# Patient Record
Sex: MALE | Race: WHITE | NOT HISPANIC OR LATINO | Employment: STUDENT | URBAN - METROPOLITAN AREA
[De-identification: names, ages, dates, MRNs, and addresses within clinical notes are randomized per-mention and may not be internally consistent; named-entity substitution may affect disease eponyms.]

---
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
## 2024-07-27 ENCOUNTER — HOSPITAL ENCOUNTER (INPATIENT)
Facility: HOSPITAL | Age: 19
LOS: 9 days | Discharge: HOME | DRG: 885 | End: 2024-08-05
Attending: HOSPITALIST | Admitting: PSYCHIATRY & NEUROLOGY
Payer: COMMERCIAL

## 2024-07-27 ENCOUNTER — HOSPITAL ENCOUNTER (EMERGENCY)
Facility: HOSPITAL | Age: 19
End: 2024-07-27
Attending: EMERGENCY MEDICINE | Admitting: EMERGENCY MEDICINE
Payer: COMMERCIAL

## 2024-07-27 ENCOUNTER — OFFICE VISIT (OUTPATIENT)
Dept: BEHAVIORAL/MENTAL HEALTH CLINIC | Facility: CLINIC | Age: 19
End: 2024-07-27

## 2024-07-27 VITALS
SYSTOLIC BLOOD PRESSURE: 113 MMHG | TEMPERATURE: 98.7 F | BODY MASS INDEX: 21.7 KG/M2 | OXYGEN SATURATION: 100 % | HEART RATE: 64 BPM | HEIGHT: 71 IN | RESPIRATION RATE: 18 BRPM | DIASTOLIC BLOOD PRESSURE: 73 MMHG | WEIGHT: 155 LBS

## 2024-07-27 DIAGNOSIS — E55.9 VITAMIN D DEFICIENCY: ICD-10-CM

## 2024-07-27 DIAGNOSIS — E53.8 VITAMIN B12 DEFICIENCY: ICD-10-CM

## 2024-07-27 DIAGNOSIS — Z00.8 MEDICAL CLEARANCE FOR PSYCHIATRIC ADMISSION: ICD-10-CM

## 2024-07-27 DIAGNOSIS — G47.00 INSOMNIA: ICD-10-CM

## 2024-07-27 DIAGNOSIS — F41.1 GENERALIZED ANXIETY DISORDER WITH PANIC ATTACKS: ICD-10-CM

## 2024-07-27 DIAGNOSIS — F33.1 MDD (MAJOR DEPRESSIVE DISORDER), RECURRENT EPISODE, MODERATE (HCC): Primary | ICD-10-CM

## 2024-07-27 DIAGNOSIS — F33.9 MAJOR DEPRESSIVE DISORDER, RECURRENT EPISODE WITH ANXIOUS DISTRESS (HCC): Primary | ICD-10-CM

## 2024-07-27 DIAGNOSIS — F41.0 GENERALIZED ANXIETY DISORDER WITH PANIC ATTACKS: ICD-10-CM

## 2024-07-27 DIAGNOSIS — R45.851 SUICIDAL IDEATION: Primary | ICD-10-CM

## 2024-07-27 LAB
AMPHETAMINES SERPL QL SCN: NEGATIVE
BARBITURATES UR QL: NEGATIVE
BENZODIAZ UR QL: NEGATIVE
COCAINE UR QL: NEGATIVE
ETHANOL EXG-MCNC: 0 MG/DL
FENTANYL UR QL SCN: NEGATIVE
HYDROCODONE UR QL SCN: NEGATIVE
METHADONE UR QL: NEGATIVE
OPIATES UR QL SCN: NEGATIVE
OXYCODONE+OXYMORPHONE UR QL SCN: NEGATIVE
PCP UR QL: NEGATIVE
THC UR QL: NEGATIVE

## 2024-07-27 PROCEDURE — 82075 ASSAY OF BREATH ETHANOL: CPT | Performed by: EMERGENCY MEDICINE

## 2024-07-27 PROCEDURE — 80307 DRUG TEST PRSMV CHEM ANLYZR: CPT | Performed by: EMERGENCY MEDICINE

## 2024-07-27 PROCEDURE — 99285 EMERGENCY DEPT VISIT HI MDM: CPT | Performed by: EMERGENCY MEDICINE

## 2024-07-27 PROCEDURE — 99285 EMERGENCY DEPT VISIT HI MDM: CPT

## 2024-07-27 RX ORDER — OLANZAPINE 10 MG/2ML
5 INJECTION, POWDER, FOR SOLUTION INTRAMUSCULAR
Status: DISCONTINUED | OUTPATIENT
Start: 2024-07-27 | End: 2024-08-05 | Stop reason: HOSPADM

## 2024-07-27 RX ORDER — DIPHENHYDRAMINE HYDROCHLORIDE 50 MG/ML
50 INJECTION INTRAMUSCULAR; INTRAVENOUS EVERY 6 HOURS PRN
Status: CANCELLED | OUTPATIENT
Start: 2024-07-27

## 2024-07-27 RX ORDER — OLANZAPINE 10 MG/2ML
2.5 INJECTION, POWDER, FOR SOLUTION INTRAMUSCULAR
Status: CANCELLED | OUTPATIENT
Start: 2024-07-27

## 2024-07-27 RX ORDER — HYDROXYZINE HYDROCHLORIDE 25 MG/1
25 TABLET, FILM COATED ORAL
Status: CANCELLED | OUTPATIENT
Start: 2024-07-27

## 2024-07-27 RX ORDER — OLANZAPINE 5 MG/1
5 TABLET ORAL
Status: CANCELLED | OUTPATIENT
Start: 2024-07-27

## 2024-07-27 RX ORDER — HYDROXYZINE HYDROCHLORIDE 25 MG/1
25 TABLET, FILM COATED ORAL
Status: DISCONTINUED | OUTPATIENT
Start: 2024-07-27 | End: 2024-08-05 | Stop reason: HOSPADM

## 2024-07-27 RX ORDER — OLANZAPINE 10 MG/2ML
5 INJECTION, POWDER, FOR SOLUTION INTRAMUSCULAR
Status: CANCELLED | OUTPATIENT
Start: 2024-07-27

## 2024-07-27 RX ORDER — HYDROXYZINE 50 MG/1
100 TABLET, FILM COATED ORAL
Status: CANCELLED | OUTPATIENT
Start: 2024-07-27

## 2024-07-27 RX ORDER — TRAZODONE HYDROCHLORIDE 50 MG/1
50 TABLET ORAL
Status: CANCELLED | OUTPATIENT
Start: 2024-07-27

## 2024-07-27 RX ORDER — AMOXICILLIN 250 MG
1 CAPSULE ORAL DAILY PRN
Status: DISCONTINUED | OUTPATIENT
Start: 2024-07-27 | End: 2024-08-05 | Stop reason: HOSPADM

## 2024-07-27 RX ORDER — BENZTROPINE MESYLATE 1 MG/ML
1 INJECTION INTRAMUSCULAR; INTRAVENOUS
Status: CANCELLED | OUTPATIENT
Start: 2024-07-27

## 2024-07-27 RX ORDER — OLANZAPINE 2.5 MG/1
2.5 TABLET, FILM COATED ORAL
Status: CANCELLED | OUTPATIENT
Start: 2024-07-27

## 2024-07-27 RX ORDER — ACETAMINOPHEN 325 MG/1
650 TABLET ORAL EVERY 4 HOURS PRN
Status: DISCONTINUED | OUTPATIENT
Start: 2024-07-27 | End: 2024-08-05 | Stop reason: HOSPADM

## 2024-07-27 RX ORDER — BENZTROPINE MESYLATE 1 MG/1
1 TABLET ORAL
Status: DISCONTINUED | OUTPATIENT
Start: 2024-07-27 | End: 2024-08-05 | Stop reason: HOSPADM

## 2024-07-27 RX ORDER — OLANZAPINE 2.5 MG/1
2.5 TABLET, FILM COATED ORAL
Status: DISCONTINUED | OUTPATIENT
Start: 2024-07-27 | End: 2024-08-05 | Stop reason: HOSPADM

## 2024-07-27 RX ORDER — HYDROXYZINE 50 MG/1
50 TABLET, FILM COATED ORAL
Status: DISCONTINUED | OUTPATIENT
Start: 2024-07-27 | End: 2024-08-05 | Stop reason: HOSPADM

## 2024-07-27 RX ORDER — BENZTROPINE MESYLATE 0.5 MG/1
1 TABLET ORAL
Status: CANCELLED | OUTPATIENT
Start: 2024-07-27

## 2024-07-27 RX ORDER — ACETAMINOPHEN 325 MG/1
975 TABLET ORAL EVERY 6 HOURS PRN
Status: DISCONTINUED | OUTPATIENT
Start: 2024-07-27 | End: 2024-08-05 | Stop reason: HOSPADM

## 2024-07-27 RX ORDER — HYDROXYZINE 50 MG/1
50 TABLET, FILM COATED ORAL
Status: CANCELLED | OUTPATIENT
Start: 2024-07-27

## 2024-07-27 RX ORDER — BENZTROPINE MESYLATE 1 MG/ML
1 INJECTION INTRAMUSCULAR; INTRAVENOUS
Status: DISCONTINUED | OUTPATIENT
Start: 2024-07-27 | End: 2024-08-05 | Stop reason: HOSPADM

## 2024-07-27 RX ORDER — DIPHENHYDRAMINE HYDROCHLORIDE 50 MG/ML
50 INJECTION INTRAMUSCULAR; INTRAVENOUS EVERY 6 HOURS PRN
Status: DISCONTINUED | OUTPATIENT
Start: 2024-07-27 | End: 2024-08-05 | Stop reason: HOSPADM

## 2024-07-27 RX ORDER — OLANZAPINE 5 MG/1
5 TABLET ORAL
Status: DISCONTINUED | OUTPATIENT
Start: 2024-07-27 | End: 2024-08-05 | Stop reason: HOSPADM

## 2024-07-27 RX ORDER — OLANZAPINE 10 MG/2ML
2.5 INJECTION, POWDER, FOR SOLUTION INTRAMUSCULAR
Status: DISCONTINUED | OUTPATIENT
Start: 2024-07-27 | End: 2024-08-05 | Stop reason: HOSPADM

## 2024-07-27 RX ORDER — TRAZODONE HYDROCHLORIDE 50 MG/1
50 TABLET ORAL
Status: DISCONTINUED | OUTPATIENT
Start: 2024-07-27 | End: 2024-08-02

## 2024-07-27 RX ORDER — HYDROXYZINE 50 MG/1
100 TABLET, FILM COATED ORAL
Status: DISCONTINUED | OUTPATIENT
Start: 2024-07-27 | End: 2024-08-05 | Stop reason: HOSPADM

## 2024-07-27 RX ORDER — PROPRANOLOL HYDROCHLORIDE 10 MG/1
10 TABLET ORAL EVERY 8 HOURS PRN
Status: DISCONTINUED | OUTPATIENT
Start: 2024-07-27 | End: 2024-08-05 | Stop reason: HOSPADM

## 2024-07-27 RX ORDER — PROPRANOLOL HYDROCHLORIDE 20 MG/1
10 TABLET ORAL EVERY 8 HOURS PRN
Status: CANCELLED | OUTPATIENT
Start: 2024-07-27

## 2024-07-27 RX ORDER — ACETAMINOPHEN 325 MG/1
650 TABLET ORAL EVERY 4 HOURS PRN
Status: CANCELLED | OUTPATIENT
Start: 2024-07-27

## 2024-07-27 RX ORDER — LORAZEPAM 2 MG/ML
2 INJECTION INTRAMUSCULAR EVERY 6 HOURS PRN
Status: CANCELLED | OUTPATIENT
Start: 2024-07-27

## 2024-07-27 RX ORDER — ACETAMINOPHEN 325 MG/1
650 TABLET ORAL EVERY 6 HOURS PRN
Status: CANCELLED | OUTPATIENT
Start: 2024-07-27

## 2024-07-27 RX ORDER — ACETAMINOPHEN 325 MG/1
650 TABLET ORAL EVERY 6 HOURS PRN
Status: DISCONTINUED | OUTPATIENT
Start: 2024-07-27 | End: 2024-08-05 | Stop reason: HOSPADM

## 2024-07-27 RX ORDER — AMOXICILLIN 250 MG
1 CAPSULE ORAL DAILY PRN
Status: CANCELLED | OUTPATIENT
Start: 2024-07-27

## 2024-07-27 RX ORDER — ACETAMINOPHEN 325 MG/1
975 TABLET ORAL EVERY 6 HOURS PRN
Status: CANCELLED | OUTPATIENT
Start: 2024-07-27

## 2024-07-27 RX ORDER — LORAZEPAM 2 MG/ML
2 INJECTION INTRAMUSCULAR EVERY 6 HOURS PRN
Status: DISCONTINUED | OUTPATIENT
Start: 2024-07-27 | End: 2024-08-05 | Stop reason: HOSPADM

## 2024-07-27 NOTE — ED PROVIDER NOTES
"History  Chief Complaint   Patient presents with    Suicidal     Pt reports that he has ha si for several months the patient. Pt reports that he has two plans to either jump off a parking garage or slashing his throat. Pt reports up and down thoughts. Has never been on medication      Patient is a 18-year-old male who presents from the [facility] [place] via EMS for evaluation of SI and major depression.  Patient signed a 201 at the facility.  He has been having thoughts of wanting to harm himself for the last 70 years but has been worsening over the last 3 to 4 months patient says that he has thoughts of either \"jumping off of a parking garage or slashing his throat.\"  The patient has never been on any psychiatric medications for this.  Patient denies any physical complaints at this time.  He denies any drug or alcohol use.  Denies any homicidal ideation, visual or auditory hallucinations        None       Past Medical History:   Diagnosis Date    No pertinent past medical history     RSV infection        Past Surgical History:   Procedure Laterality Date    HERNIA REPAIR      TYMPANOPLASTY      TYMPANOPLASTY      X 4        Family History   Problem Relation Age of Onset    No Known Problems Mother     No Known Problems Father     Thyroid disease Maternal Grandmother     Hyperlipidemia Maternal Grandmother     Heart attack Maternal Grandfather     Thyroid disease Maternal Grandfather      I have reviewed and agree with the history as documented.    E-Cigarette/Vaping    E-Cigarette Use Never User      E-Cigarette/Vaping Substances    Nicotine No     THC No     CBD No     Flavoring No     Other No     Unknown No      Social History     Tobacco Use    Smoking status: Never    Smokeless tobacco: Never   Vaping Use    Vaping status: Never Used   Substance Use Topics    Alcohol use: Not Currently    Drug use: Not Currently       Review of Systems   Constitutional:  Negative for chills, fever and unexpected weight change. "   HENT:  Negative for congestion, sore throat and trouble swallowing.    Eyes:  Negative for pain, discharge and itching.   Respiratory:  Negative for cough, chest tightness, shortness of breath and wheezing.    Cardiovascular:  Negative for chest pain, palpitations and leg swelling.   Gastrointestinal:  Negative for abdominal pain, blood in stool, diarrhea, nausea and vomiting.   Endocrine: Negative for polyuria.   Genitourinary:  Negative for difficulty urinating, dysuria, frequency and hematuria.   Musculoskeletal:  Negative for arthralgias and back pain.   Neurological:  Negative for dizziness, syncope, weakness, light-headedness and headaches.   Psychiatric/Behavioral:  Positive for suicidal ideas.        Physical Exam  Physical Exam  Vitals and nursing note reviewed.   Constitutional:       General: He is not in acute distress.     Appearance: He is well-developed.   HENT:      Head: Normocephalic and atraumatic.      Right Ear: External ear normal.      Left Ear: External ear normal.   Eyes:      Conjunctiva/sclera: Conjunctivae normal.      Pupils: Pupils are equal, round, and reactive to light.   Cardiovascular:      Rate and Rhythm: Normal rate and regular rhythm.      Heart sounds: Normal heart sounds. No murmur heard.     No friction rub. No gallop.   Pulmonary:      Effort: Pulmonary effort is normal. No respiratory distress.      Breath sounds: Normal breath sounds. No wheezing or rales.   Abdominal:      General: Bowel sounds are normal. There is no distension.      Palpations: Abdomen is soft.      Tenderness: There is no abdominal tenderness. There is no guarding.   Musculoskeletal:         General: No tenderness or deformity. Normal range of motion.      Cervical back: Normal range of motion.   Lymphadenopathy:      Cervical: No cervical adenopathy.   Skin:     General: Skin is warm and dry.   Neurological:      General: No focal deficit present.      Mental Status: He is alert and oriented to  person, place, and time. Mental status is at baseline.      Cranial Nerves: No cranial nerve deficit.      Sensory: No sensory deficit.      Motor: No weakness or abnormal muscle tone.   Psychiatric:         Attention and Perception: Attention normal.         Mood and Affect: Mood normal.         Speech: Speech normal.         Behavior: Behavior normal.         Thought Content: Thought content is not paranoid or delusional. Thought content includes suicidal ideation. Thought content does not include homicidal ideation. Thought content includes suicidal plan. Thought content does not include homicidal plan.         Cognition and Memory: Cognition normal.         Judgment: Judgment normal.         Vital Signs  ED Triage Vitals   Temperature Pulse Respirations Blood Pressure SpO2   07/27/24 1055 07/27/24 1055 07/27/24 1055 07/27/24 1058 07/27/24 1055   98.7 °F (37.1 °C) 61 18 113/73 100 %      Temp Source Heart Rate Source Patient Position - Orthostatic VS BP Location FiO2 (%)   07/27/24 1055 07/27/24 1055 07/27/24 1055 -- --   Temporal Monitor Lying        Pain Score       07/27/24 1055       No Pain           Vitals:    07/27/24 1055 07/27/24 1058 07/27/24 1100   BP:  113/73 113/73   Pulse: 61  64   Patient Position - Orthostatic VS: Lying           Visual Acuity      ED Medications  Medications - No data to display    Diagnostic Studies  Results Reviewed       Procedure Component Value Units Date/Time    Rapid drug screen, urine [[id number]]  (Normal) Collected: 07/27/24 1108    Lab Status: Final result Specimen: Urine, Clean Catch Updated: 07/27/24 1125     Amph/Meth UR Negative     Barbiturate Ur Negative     Benzodiazepine Urine Negative     Cocaine Urine Negative     Methadone Urine Negative     Opiate Urine Negative     PCP Ur Negative     THC Urine Negative     Oxycodone Urine Negative     Fentanyl Urine Negative     HYDROCODONE URINE Negative    Narrative:      FOR MEDICAL PURPOSES ONLY.   IF CONFIRMATION  "NEEDED PLEASE CONTACT THE LAB WITHIN 5 DAYS.    Drug Screen Cutoff Levels:  AMPHETAMINE/METHAMPHETAMINES  1000 ng/mL  BARBITURATES     200 ng/mL  BENZODIAZEPINES     200 ng/mL  COCAINE      300 ng/mL  METHADONE      300 ng/mL  OPIATES      300 ng/mL  PHENCYCLIDINE     25 ng/mL  THC       50 ng/mL  OXYCODONE      100 ng/mL  FENTANYL      5 ng/mL  HYDROCODONE     300 ng/mL    POCT alcohol breath test [[id number]]  (Normal) Resulted: 07/27/24 1113    Lab Status: Final result Updated: 07/27/24 1113     EXTBreath Alcohol 0.000                   No orders to display              Procedures  Procedures         ED Course  ED Course as of 07/27/24 1546   Sat Jul 27, 2024   1431 Patient accepted at the [facility].  Pickup at 4 PM         CRAFFT      Flowsheet Row Most Recent Value   CRAFFT Initial Screen: During the past 12 months, did you:    1. Drink any alcohol (more than a few sips)?  No Filed at: 07/27/2024 1059   2. Smoke any marijuana or hashish No Filed at: 07/27/2024 1059   3. Use anything else to get high? (\"anything else\" includes illegal drugs, over the counter and prescription drugs, and things that you sniff or 'parrish')? No Filed at: 07/27/2024 1059                                              Medical Decision Making  18-year-old male presenting for suicidal ideation.  Has been ongoing over the last 70 years but worsening over last 3 to 4 months.  Admits to thoughts of wanting to jump off a parking garage or slash his throat.  Has never been seen for this in the past has not been on many medications.  Signed 201 at outpatient center.  Will obtain point-of-care alcohol, UDS.  Will have crisis evaluate for bed placement  Patient signed 201.  Excepted at [place] and [facility].    Problems Addressed:  Suicidal ideation: acute illness or injury    Amount and/or Complexity of Data Reviewed  Labs: ordered.    Risk  Decision regarding hospitalization.                 Disposition  Final diagnoses:   Suicidal ideation     Time " reflects when diagnosis was documented in both MDM as applicable and the Disposition within this note       Time User Action Codes Description Comment    7/27/2024 11:19 AM Mannie Virgen Add [R45.851] Suicidal ideation     7/27/2024  1:45 PM Lester Ochoa Add [Z00.8] Medical clearance for psychiatric admission           ED Disposition       ED Disposition   Transfer to Behavioral Health Condition   --    Date/Time   Sat Jul 27, 2024 1119    Comment   Lester Maher should be transferred out to [facility] and has been medically cleared.               MD Documentation      Flowsheet Row Most Recent Value   Patient Condition The patient has been stabilized such that within reasonable medical probability, no material deterioration of the patient condition or the condition of the unborn child(brian) is likely to result from the transfer   Reason for Transfer Level of Care needed not available at this facility   Benefits of Transfer Specialized equipment and/or services available at the receiving facility (Include comment)________________________  [psychiatric treatment]   Risks of Transfer Potential for delay in receiving treatment   Accepting Physician Dr. Ochoa   Accepting Facility Name, [place], [place]    (Name & Tel number) [facility] - [phone]   Transported by (Company and Unit #) [facility]   Sending MD Dr. Virgen   Provider Certification General risk, such as traffic hazards, adverse weather conditions, rough terrain or turbulence, possible failure of equipment (including vehicle or aircraft), or consequences of actions of persons outside the control of the transport personnel, The patient is stable for psychiatric transfer because they are medically stable, and is protected from harming him/herself or others during transport          RN Documentation      Flowsheet Row Most Recent Value   Accepting Facility Name, [place], [place]   Transfer  Coordinator (Name & Tel number) [facility] - [phone]   Transport Mode Ambulance   Transported by (Company and Unit #) [facility]   Level of Care Other (Comment)  [secure psych transport]   Patient Belongings Disposition Sent with patient   Transfer Date 07/27/24   Transfer Time 1600          Follow-up Information    None         Patient's Medications    No medications on file       No discharge procedures on file.    PDMP Review       None            ED Provider  Electronically Signed by             Mannie Virgen DO  07/27/24 8703

## 2024-07-27 NOTE — NURSING NOTE
"18 year old male admitted from [place] ED with SI and high anxiety Patient does not identify a specific cause of anxiety at this time. Noted history of self harm by cutting, patient has no scares at this time. Calm cooperative, lives at home with dad no psych history, not taking any mediations UDS neg. Reports \"normal\" relationship with father \"ok\" relationship with mom who lives in [place]. Refused shower skin check completed and wnl paper work signed oriented to unit, Safety checks initiated.   "

## 2024-07-27 NOTE — ED NOTES
Patient is accepted at [facility].  Patient is accepted by Dr. Gabriela Hartman.     Transportation is arranged with [facility].     Transportation is scheduled for 16:00.   Patient may go to the floor at any time.          Nurse report is to be called to [phone] prior to patient transfer.

## 2024-07-27 NOTE — PROGRESS NOTES
"Assessment      Crisis Intake  Patient Intake  Special Needs: N/A  Living Arrangement: Lives with someone (lives with parents)  Can patient return home?: Yes (following hospital evaluation and potential inpatient stay)  Address to be Discharge to:: see chart  Patient's Telephone Number: [phone]  Access to Firearms: No  Type of Work: [employer]  Work History: Part-time  School Name: [employer] (one year)  School Grade/Year: College freshman  Unemployed / MA applicant:: N/A  Patient History  Presenting Problems: Patient presents to the walk-in center with suicidal ideations and visual hallucinations. Patient reports that he recently cut himself in the chest area in response to ongoing anxiety. Patient reports that he \"doesn't see people or buildings, only open husks.\" Patient also reports seeing \"ghosts.\" Patient reports experiencing \"bizarre thoughts like wondering what another person's blood might taste like.\" Patient stated that he \"fears he may murder someone\" but then denies having thoughts to harm others. Patient states that he has a history of self-harm by cutting and states \"it was fun.\" Patient reports that his anxiety is \"raised to the max\" and he is experiencing \"random irritability.\" Patient lives at home with his parents and works part time at [employer]. Patient denies changes in eating or sleeping habits. Patient denies history of mental health treatment or substance abuse as well as any legal involvement. Patient reports that he talked with school counselors in high school and would have \"casual conversations with psychology professors\" in college but \"nothing official.\" This writer reviewed the 201 process and patient rights to which patient acknowledged understanding. Patient signed a 201 and is being transported to [facility]. Crisis worker and charge nurse notified.  Treatment History: School counseling in high " "school  Currently in Treatment: No  Name of ICM:: N/A  ICM Phone Number:: N/A  Community Agency Supports: N/A  Medical Problems: seasonal allergies  Legal Issues: N/A  Probation/ Name (if applicable): denies  Substance Abuse: No  Mental Status Exam  Orientation Level: Oriented X4  Affect: Appropriate  Speech: Soft  Mood: Irritable, Anxious  Thought Content: Suicidal ideation, Homicidal ideation  Hallucination Type: Auditory, Visual  Describe Hallucinations: Patient reports an inability to see people or buildings, stating he only sees \"husks.\" Patient also describes seeing ghosts.  Judgement: Poor  Impulse Control: Poor  Attention Span: Appropriate for age  Memory: Impaired  Appetite: Good  Weight Changes: denies  Sleep: Good  Total Hours of Sleep: 8 hours nightly  Specific Sleep Problem: denies  Appear/Hygiene: Bizarre  ADL Comments: Independent  Strengths and Limitations  Patient Strengths: Good support system, Financially secure, Insightful, Self reliant, Negotiates basic needs, Family ties  Patient Limitations: Difficulty adapting, Poor reasoning ability  Ideations  Current Self Harm/Suicidal Ideation: Yes  Description of current self harm/suicidal ideation:: patient reports self harm via cutting chest in recent weeks  Previous Self Harm/Suicidal Ideation: Yes  Description of self harming behaviors or thoughts:: patient reports self harm(cutting) on chest in recent weeks  Violence Risk to Self: Yes- Within the past 6 months  Current homicidal or violent thoughts toward another: Yes- Within the past 6 months (Patient states that he worries he will murder someone but then denies thoughts to harm others.)  Description of current thoughts/plans:: N/A  Previous Plans to Harm Another Person: No  Description of violent thoughts or behaviors toward others:: Patient states that he worries he will murder someone but then denies thoughts to harm others.  Violence Risk to Others: Yes- Within the last 6 months " (Patient states that he worries he will murder someone but then denies thoughts to harm others.)  Previous History of Violence to Others: No  Provisional Diagnosis  Axis I: Major Depressive Disorder    C-SSRS  [place] Suicide Severity Rating Scale  C-SSRS Q1. Wish to be Dead: No - In the Past Month  C-SSRS Q2. Suicidal Thoughts: Yes - In the Past Month  C-SSRS Q3. Suicidal Thoughts with Method (without Specific Plan or Intent to Act): Yes - In the Past Month  C-SSRS Q4. Suicidal Intent (without Specific Plan): No - In the Past Month  C-SSRS Q5. Suicide Intent with Specific Plan: Yes - In the Past Month  C-SSRS Q6. Suicide Behavior Question: Yes  C-SSRS Q6a. Was this within the past 3 months? : Yes  *Risk Level*: High Risk      Patient was referred by: Self or Family    Visit start and stop times:    07/27/24  Start Time: (P) 0900  Stop Time: (P) 1000  Total Visit Time: (P) 60 minutes      Patient transported to [facility] via ambulance.

## 2024-07-27 NOTE — LETTER
[facility]  [address] [facility] [address]  [phone]  Dept: [phone]    August 5, 2024    Patient: Lester Maher   YOB: 2005   Date of Visit: 7/27/2024       To Whom it May Concern:    Lester Maher was under my professional care. He was seen in the hospital from 7/27/2024 to 08/05/2021. He may return to work on 8/6/2024 without limitations.    If you have any questions or concerns, please don't hesitate to call.         Sincerely,          Aziza Garcia

## 2024-07-27 NOTE — LETTER
[facility] EMERGENCY DEPARTMENT  [address] [place] [address]  Dept: [phone]      EMTALA TRANSFER CONSENT    NAME Lester Maher                                         2005                              MRN [id number]    I have been informed of my rights regarding examination, treatment, and transfer   by Dr. Mannie Virgen DO    Benefits: Specialized equipment and/or services available at the receiving facility (Include comment)________________________ (psychiatric treatment)    Risks: Potential for delay in receiving treatment    Transfer Request   I acknowledge that my medical condition has been evaluated and explained to me by the emergency department physician or other qualified medical person and/or my attending physician who has recommended and offered to me further medical examination and treatment. I understand the Hospital's obligation with respect to the treatment and stabilization of my emergency medical condition. I nevertheless request to be transferred. I release the Hospital, the doctor, and any other persons caring for me from all responsibility or liability for any injury or ill effects that may result from my transfer and agree to accept all responsibility for the consequences of my choice to transfer, rather than receive stabilizing treatment at the Hospital. I understand that because the transfer is my request, my insurance may not provide reimbursement for the services.  The Hospital will assist and direct me and my family in how to make arrangements for transfer, but the hospital is not liable for any fees charged by the transport service.  In spite of this understanding, I refuse to consent to further medical examination and treatment which has been offered to me, and request transfer to Accepting Facility Name, City & State : [place]. I authorize the performance of emergency medical procedures and treatments upon me in both transit  and upon arrival at the receiving facility.  Additionally, I authorize the release of any and all medical records to the receiving facility and request they be transported with me, if possible.    I authorize the performance of emergency medical procedures and treatments upon me in both transit and upon arrival at the receiving facility.  Additionally, I authorize the release of any and all medical records to the receiving facility and request they be transported with me, if possible.  I understand that the safest mode of transportation during a medical emergency is an ambulance and that the Hospital advocates the use of this mode of transport. Risks of traveling to the receiving facility by car, including absence of medical control, life sustaining equipment, such as oxygen, and medical personnel has been explained to me and I fully understand them.    (SUSU CORRECT BOX BELOW)  [ X ]  I consent to the stated transfer and to be transported by ambulance/helicopter.  [  ]  I consent to the stated transfer, but refuse transportation by ambulance and accept full responsibility for my transportation by car.  I understand the risks of non-ambulance transfers and I exonerate the Hospital and its staff from any deterioration in my condition that results from this refusal.    X___________________________________________    DATE  24  TIME________  Signature of patient or legally responsible individual signing on patient behalf           RELATIONSHIP TO PATIENT____self_____________________          Provider Certification    NAME Lester Maher                                         2005                              MRN [id number]    A medical screening exam was performed on the above named patient.  Based on the examination:    Condition Necessitating Transfer The primary encounter diagnosis was Suicidal ideation. A diagnosis of Medical clearance for psychiatric admission was also pertinent to this  visit.    Patient Condition: The patient has been stabilized such that within reasonable medical probability, no material deterioration of the patient condition or the condition of the unborn child(brian) is likely to result from the transfer    Reason for Transfer: Level of Care needed not available at this facility    Transfer Requirements: [place], [place]   Space available and qualified personnel available for treatment as acknowledged by [facility] - [phone]  Agreed to accept transfer and to provide appropriate medical treatment as acknowledged by       Dr. Ochoa  Appropriate medical records of the examination and treatment of the patient are provided at the time of transfer   STAFF INITIAL WHEN COMPLETED _______  Transfer will be performed by qualified personnel from [facility]  and appropriate transfer equipment as required, including the use of necessary and appropriate life support measures.    Provider Certification: I have examined the patient and explained the following risks and benefits of being transferred/refusing transfer to the patient/family:  General risk, such as traffic hazards, adverse weather conditions, rough terrain or turbulence, possible failure of equipment (including vehicle or aircraft), or consequences of actions of persons outside the control of the transport personnel, The patient is stable for psychiatric transfer because they are medically stable, and is protected from harming him/herself or others during transport      Based on these reasonable risks and benefits to the patient and/or the unborn child(brian), and based upon the information available at the time of the patient’s examination, I certify that the medical benefits reasonably to be expected from the provision of appropriate medical treatments at another medical facility outweigh the increasing risks, if any, to the individual’s medical condition, and in the case of labor to the unborn child, from  effecting the transfer.    X____________________________________________ DATE 07/27/24        TIME__14:30_____      ORIGINAL - SEND TO MEDICAL RECORDS   COPY - SEND WITH PATIENT DURING TRANSFER

## 2024-07-27 NOTE — ED NOTES
Insurance Authorization for admission:   Phone call placed to [facility].  Phone number: [phone].     Spoke to Anna Marie.     4 days approved.  Level of care: 201.  Review on 7/30.   Authorization # [id number].

## 2024-07-27 NOTE — PROGRESS NOTES
[facility] staff returned to walk-in [place] with patient's car keys. They had been told that patient's friend, who accompanied him to [facility], would be waiting here to retrieve personal items from the vehicle. Patient's keys will be provided to security with a note that Raquel Mullenmons has been given permission by patient to retrieve items from vehicle.

## 2024-07-28 PROBLEM — F41.0 GENERALIZED ANXIETY DISORDER WITH PANIC ATTACKS: Status: ACTIVE | Noted: 2024-07-28

## 2024-07-28 PROBLEM — F41.1 GENERALIZED ANXIETY DISORDER WITH PANIC ATTACKS: Status: ACTIVE | Noted: 2024-07-28

## 2024-07-28 PROBLEM — F33.1 MDD (MAJOR DEPRESSIVE DISORDER), RECURRENT EPISODE, MODERATE (HCC): Status: ACTIVE | Noted: 2024-07-28

## 2024-07-28 LAB
25(OH)D3 SERPL-MCNC: 12.4 NG/ML (ref 30–100)
ALBUMIN SERPL BCG-MCNC: 4.6 G/DL (ref 3.5–5)
ALP SERPL-CCNC: 54 U/L (ref 34–104)
ALT SERPL W P-5'-P-CCNC: 6 U/L (ref 7–52)
ANION GAP SERPL CALCULATED.3IONS-SCNC: 8 MMOL/L (ref 4–13)
AST SERPL W P-5'-P-CCNC: 12 U/L (ref 13–39)
BASOPHILS # BLD AUTO: 0.02 THOUSANDS/ÂΜL (ref 0–0.1)
BASOPHILS NFR BLD AUTO: 0 % (ref 0–1)
BILIRUB SERPL-MCNC: 3.23 MG/DL (ref 0.2–1)
BUN SERPL-MCNC: 9 MG/DL (ref 5–25)
CALCIUM SERPL-MCNC: 9.3 MG/DL (ref 8.4–10.2)
CHLORIDE SERPL-SCNC: 106 MMOL/L (ref 96–108)
CHOLEST SERPL-MCNC: 124 MG/DL
CO2 SERPL-SCNC: 26 MMOL/L (ref 21–32)
CREAT SERPL-MCNC: 0.85 MG/DL (ref 0.6–1.3)
EOSINOPHIL # BLD AUTO: 0.11 THOUSAND/ÂΜL (ref 0–0.61)
EOSINOPHIL NFR BLD AUTO: 2 % (ref 0–6)
ERYTHROCYTE [DISTWIDTH] IN BLOOD BY AUTOMATED COUNT: 12.1 % (ref 11.6–15.1)
FOLATE SERPL-MCNC: 9.2 NG/ML
GFR SERPL CREATININE-BSD FRML MDRD: 125 ML/MIN/1.73SQ M
GLUCOSE P FAST SERPL-MCNC: 77 MG/DL (ref 65–99)
GLUCOSE SERPL-MCNC: 77 MG/DL (ref 65–140)
HCT VFR BLD AUTO: 39.8 % (ref 36.5–49.3)
HDLC SERPL-MCNC: 44 MG/DL
HGB BLD-MCNC: 13.4 G/DL (ref 12–17)
IMM GRANULOCYTES # BLD AUTO: 0.01 THOUSAND/UL (ref 0–0.2)
IMM GRANULOCYTES NFR BLD AUTO: 0 % (ref 0–2)
LDLC SERPL CALC-MCNC: 70 MG/DL (ref 0–100)
LYMPHOCYTES # BLD AUTO: 2.05 THOUSANDS/ÂΜL (ref 0.6–4.47)
LYMPHOCYTES NFR BLD AUTO: 32 % (ref 14–44)
MCH RBC QN AUTO: 30.3 PG (ref 26.8–34.3)
MCHC RBC AUTO-ENTMCNC: 33.7 G/DL (ref 31.4–37.4)
MCV RBC AUTO: 90 FL (ref 82–98)
MONOCYTES # BLD AUTO: 0.48 THOUSAND/ÂΜL (ref 0.17–1.22)
MONOCYTES NFR BLD AUTO: 8 % (ref 4–12)
NEUTROPHILS # BLD AUTO: 3.67 THOUSANDS/ÂΜL (ref 1.85–7.62)
NEUTS SEG NFR BLD AUTO: 58 % (ref 43–75)
NRBC BLD AUTO-RTO: 0 /100 WBCS
PLATELET # BLD AUTO: 222 THOUSANDS/UL (ref 149–390)
PMV BLD AUTO: 9.8 FL (ref 8.9–12.7)
POTASSIUM SERPL-SCNC: 3.8 MMOL/L (ref 3.5–5.3)
PROT SERPL-MCNC: 6.4 G/DL (ref 6.4–8.4)
RBC # BLD AUTO: 4.42 MILLION/UL (ref 3.88–5.62)
SODIUM SERPL-SCNC: 140 MMOL/L (ref 135–147)
TRIGL SERPL-MCNC: 49 MG/DL
TSH SERPL DL<=0.05 MIU/L-ACNC: 0.62 UIU/ML (ref 0.45–4.5)
VIT B12 SERPL-MCNC: 215 PG/ML (ref 180–914)
WBC # BLD AUTO: 6.34 THOUSAND/UL (ref 4.31–10.16)

## 2024-07-28 PROCEDURE — 99223 1ST HOSP IP/OBS HIGH 75: CPT

## 2024-07-28 PROCEDURE — 80053 COMPREHEN METABOLIC PANEL: CPT

## 2024-07-28 PROCEDURE — 82607 VITAMIN B-12: CPT

## 2024-07-28 PROCEDURE — 80061 LIPID PANEL: CPT

## 2024-07-28 PROCEDURE — 84443 ASSAY THYROID STIM HORMONE: CPT

## 2024-07-28 PROCEDURE — 82306 VITAMIN D 25 HYDROXY: CPT

## 2024-07-28 PROCEDURE — 82746 ASSAY OF FOLIC ACID SERUM: CPT

## 2024-07-28 PROCEDURE — 85025 COMPLETE CBC W/AUTO DIFF WBC: CPT

## 2024-07-28 RX ORDER — ESCITALOPRAM OXALATE 10 MG/1
10 TABLET ORAL DAILY
Status: DISCONTINUED | OUTPATIENT
Start: 2024-07-28 | End: 2024-08-05 | Stop reason: HOSPADM

## 2024-07-28 RX ORDER — ARIPIPRAZOLE 2 MG/1
2 TABLET ORAL DAILY
Status: DISCONTINUED | OUTPATIENT
Start: 2024-07-28 | End: 2024-07-30

## 2024-07-28 RX ADMIN — ESCITALOPRAM OXALATE 10 MG: 10 TABLET ORAL at 10:06

## 2024-07-28 RX ADMIN — ARIPIPRAZOLE 2 MG: 2 TABLET ORAL at 10:06

## 2024-07-28 NOTE — NURSING NOTE
Patient med and meal compliant visible on unit calm cooperative social with select peers, states he's adjusting to the unit, denies SI HI AVH at this time Safety checks maintained.

## 2024-07-28 NOTE — TREATMENT PLAN
TREATMENT PLAN REVIEW - Behavioral Health Lester Maher 18 y.o. 2005 male MRN: [id number]    [facility] BEHAVIORAL HEALTH Room / Bed: [facility] 218/[facility] 218-02 Encounter: [id number]          Admit Date/Time:  7/27/2024  4:36 PM    Treatment Team:   MD Tamar Villatoro LPN Andrea M Miksic Christina Ayers Kaleyann Doyle William Widarsono    Diagnosis: Principal Problem:    MDD (major depressive disorder), recurrent episode, moderate (HCC)  Active Problems:    Generalized anxiety disorder with panic attacks      Patient Strengths/Assets: ability for insight, average or above intelligence, capable of independent living, communication skills, general fund of knowledge, good physical health, insightful, motivation for treatment/growth, patient is on a voluntary commitment, patient is willing to work on problems, reasoning ability, supportive family/friends, work skills    Patient Barriers/Limitations: difficulty adapting, marital/family conflict    Short Term Goals: decrease in depressive symptoms, decrease in anxiety symptoms, decrease in suicidal thoughts, decrease in homicidal thoughts, improvement in ability to express basic needs, improvement in insight, improvement in reality testing, improvement in reasoning ability    Long Term Goals: improvement in depression, improvement in anxiety, free of suicidal thoughts, free of homicidal thoughts, acceptance of need for psychiatric medications, acceptance of need for psychiatric treatment, acceptance of need for psychiatric follow up after discharge, adequate sleep, adequate appetite, adequate oral intake, stable living arrangements upon discharge, establishment of family support upon discharge    Progress Towards Goals: starting psychiatric medications as prescribed    Recommended Treatment: medication management, patient medication education, group therapy, milieu therapy, continued Behavioral Health  psychiatric evaluation/assessment process    Treatment Frequency: daily medication monitoring, group and milieu therapy daily, monitoring through interdisciplinary rounds, monitoring through weekly patient care conferences    Expected Discharge Date:  8/2/24    Discharge Plan: referral for outpatient medication management with a psychiatrist, return to previous living arrangement    Treatment Plan Created/Updated By: Lester Ochoa MD

## 2024-07-28 NOTE — H&P
GunnarChri#  :2005 M  MRN:[id number]    CSN:[id number]  Adm Date: 2024 1636  4:36 PM   ATT PHY: Trixie Murcia Md  [facility]         Chief Complaint: depression, suicidal ideations      History of Presenting Illness: Lester Maher is a(n) 18 y.o. year old male who is admitted to [facility] on voluntary 201 commitment basis.  Patient originally presented to [facility] ED on 2024 for worsening depression with suicidal ideations.     Patient examined at bedside.  Patient currently denies any physical symptoms.  He reports no medical problems and does not take anything on a daily basis at home.    Admission labs:  Lipid panel, TSH, CBC normal.  CMP with total bilirubin 3.23.  Vitamin D, b12, folate pending.     No Known Allergies    No current facility-administered medications on file prior to encounter.     No current outpatient medications on file prior to encounter.       Active Ambulatory Problems     Diagnosis Date Noted    Exercise counseling 2020    Need for vaccination 2020    Hamstring tightness of both lower extremities 2020    Body mass index, pediatric, 5th percentile to less than 85th percentile for age 2020     Resolved Ambulatory Problems     Diagnosis Date Noted    Flat foot 2020     Past Medical History:   Diagnosis Date    No pertinent past medical history     RSV infection      Past Surgical History:   Procedure Laterality Date    HERNIA REPAIR      TYMPANOPLASTY      TYMPANOPLASTY      X 4      Social History:   Social History     Socioeconomic History    Marital status: Single     Spouse name: Not on file    Number of children: Not on file    Years of education: Not on file    Highest education level: Not on file   Occupational History    Not on file   Tobacco Use    Smoking status: Never    Smokeless tobacco: Never   Vaping Use    Vaping status: Never  "Used   Substance and Sexual Activity    Alcohol use: Not Currently    Drug use: Not Currently    Sexual activity: Not Currently   Other Topics Concern    Not on file   Social History Narrative    Two cats      Social Determinants of Health     Financial Resource Strain: Not on file   Food Insecurity: Not on file   Transportation Needs: Not on file   Physical Activity: Not on file   Stress: Not on file   Social Connections: Unknown (6/18/2024)    Received from [employer]     How often do you feel lonely or isolated from those around you? (Adult - for ages 18 years and over): Not on file   Intimate Partner Violence: Not on file   Housing Stability: Not on file     Family History:   Family History   Problem Relation Age of Onset    No Known Problems Mother     No Known Problems Father     Thyroid disease Maternal Grandmother     Hyperlipidemia Maternal Grandmother     Heart attack Maternal Grandfather     Thyroid disease Maternal Grandfather      Review of Systems   Constitutional:  Negative for chills and fever.   HENT:  Negative for ear pain and sore throat.    Eyes:  Negative for pain and visual disturbance.   Respiratory:  Negative for cough and shortness of breath.    Cardiovascular:  Negative for chest pain and palpitations.   Gastrointestinal:  Negative for abdominal pain, constipation, diarrhea, nausea and vomiting.   Genitourinary:  Negative for dysuria and hematuria.   Musculoskeletal:  Negative for arthralgias and back pain.   Skin:  Negative for color change and rash.   Neurological:  Negative for dizziness and headaches.   Psychiatric/Behavioral:  Positive for dysphoric mood and suicidal ideas.    All other systems reviewed and are negative.    Physical Exam   Vitals: Blood pressure 95/54, pulse 87, temperature 97.9 °F (36.6 °C), temperature source Temporal, resp. rate 17, height 5' 10.5\" (1.791 m), weight 64.8 kg (142 lb 12.8 oz), SpO2 98%.,Body mass index is 20.2 " kg/m².  Constitutional: Awake, alert, in no acute distress.  Head: Normocephalic and atraumatic.   Mouth/Throat: Oropharynx is clear and moist.    Eyes: Conjunctivae and EOM are normal.   Neck: Neck supple.  Cardiovascular: Normal rate, regular rhythm and normal heart sounds.    Pulmonary/Chest: Effort normal and breath sounds normal.   Abdominal: Soft. Bowel sounds are normal. There is no tenderness. There is no rebound and no guarding.   Neurological: No focal deficits.   Skin: Skin is warm and dry. No leg edema.     Assessment     Lester Maher is a(n) 18 y.o. male with MDD.    Arthralgia/headache.  Tylenol as needed.  Insomnia.  Trazodone nightly as needed.  Elevated bilirubin.  Level 3.23 on 7/28/24.  No prior labs.  No GI symptoms.  Check hepatic panel in AM.   MDD.  This is being managed by the psych team.     Prognosis: Fair.    Discharge Plan: In progress.    Advanced Directives: I have discussed in detail with the patient the advanced directives. The patient does not have an appointed POA and does not have a living will.  Patient's emergency contact is his father, Lito Souza, whose number is [phone].  When discussing cardiac and pulmonary resuscitation efforts with the patient, the patient wishes to be full code.    I have spent more than 50 minutes gathering data, doing physical examination, and discussing the advanced directives, which was witnessed by caring staff.    The patient was discussed with Dr. Bingham and he is in agreement with the above note.

## 2024-07-28 NOTE — H&P
"Psychiatric Evaluation - Behavioral Health   Lester Maher 18 y.o. male MRN: [id number]  Unit/Bed#: [facility] 218-02 Encounter: [id number]    Assessment & Plan   Principal Problem:    MDD (major depressive disorder), recurrent episode, moderate (HCC)  Active Problems:    Generalized anxiety disorder with panic attacks    Plan:     Started Lexapro 10 mg daily for symptoms of depression and anxiety  Started Abilify 2 mg daily for augmentation of antidepressant    Admission labs have been reviewed.  Frequent safety checks and vitals per unit protocol.  Collaborate with family for baseline assessment and disposition planning.  Case discussed with treatment team.  Treatment options and alternatives were reviewed with the patient.      -----------------------------------    Chief Complaint: \"I had a a panic attack at work, I realized it was getting out of control\" and \"I was having thoughts to kill myself in one of two ways, either by jumping from the parking garage or by taking a knife and stabbing myself\"    History of Present Illness     This is a comprehensive psychiatric assessment for the patient Lester Maher, who is currently being admitted to [facility] inpatient behavioral health unit on a voluntary 201 commitment basis due to worsening symptoms of depression and anxiety, with panic attacks that have been increasing in frequency and suicidal thoughts with plan to stab himself with a knife or jump off of a parking garage with the intent to die. Lester is a 18-year-old male, single (ended a relationship approximately 2 weeks ago), no children, currently living in [place] with his father, currently employed as a  for the [email or web address]. Lester has no significant past medical history. Lester has a past psychiatric history of unspecified depression and anxiety.    The following italicized information is copied from the crisis evaluation note 7/27/2024    Crisis " "Intake  Patient Intake  Special Needs: N/A  Living Arrangement: Lives with someone (lives with parents)  Can patient return home?: Yes (following hospital evaluation and potential inpatient stay)  Address to be Discharge to:: see chart  Patient's Telephone Number: [phone]  Access to Firearms: No  Type of Work: [employer]  Work History: Part-time  School Name: [employer] (one year)  School Grade/Year: College freshman  Unemployed / MA applicant:: N/A  Patient History  Presenting Problems: Patient presents to the walk-in center with suicidal ideations and visual hallucinations. Patient reports that he recently cut himself in the chest area in response to ongoing anxiety. Patient reports that he \"doesn't see people or buildings, only open husks.\" Patient also reports seeing \"ghosts.\" Patient reports experiencing \"bizarre thoughts like wondering what another person's blood might taste like.\" Patient stated that he \"fears he may murder someone\" but then denies having thoughts to harm others. Patient states that he has a history of self-harm by cutting and states \"it was fun.\" Patient reports that his anxiety is \"raised to the max\" and he is experiencing \"random irritability.\" Patient lives at home with his parents and works part time at [employer]. Patient denies changes in eating or sleeping habits. Patient denies history of mental health treatment or substance abuse as well as any legal involvement. Patient reports that he talked with school counselors in high school and would have \"casual conversations with psychology professors\" in college but \"nothing official.\" This writer reviewed the 201 process and patient rights to which patient acknowledged understanding. Patient signed a 201 and is being transported to [facility]. Crisis worker and charge nurse notified.  Treatment History: School counseling in high school  Currently in Treatment: " "No  Name of ICM:: N/A  ICM Phone Number:: N/A  Community Agency Supports: N/A  Medical Problems: seasonal allergies  Legal Issues: N/A  Probation/ Name (if applicable): denies  Substance Abuse: No  Mental Status Exam  Orientation Level: Oriented X4  Affect: Appropriate  Speech: Soft  Mood: Irritable, Anxious  Thought Content: Suicidal ideation, Homicidal ideation  Hallucination Type: Auditory, Visual  Describe Hallucinations: Patient reports an inability to see people or buildings, stating he only sees \"husks.\" Patient also describes seeing ghosts.  Judgement: Poor  Impulse Control: Poor  Attention Span: Appropriate for age  Memory: Impaired  Appetite: Good  Weight Changes: denies  Sleep: Good  Total Hours of Sleep: 8 hours nightly  Specific Sleep Problem: denies  Appear/Hygiene: Bizarre  ADL Comments: Independent  Strengths and Limitations  Patient Strengths: Good support system, Financially secure, Insightful, Self reliant, Negotiates basic needs, Family ties  Patient Limitations: Difficulty adapting, Poor reasoning ability  Ideations  Current Self Harm/Suicidal Ideation: Yes  Description of current self harm/suicidal ideation:: patient reports self harm via cutting chest in recent weeks  Previous Self Harm/Suicidal Ideation: Yes  Description of self harming behaviors or thoughts:: patient reports self harm(cutting) on chest in recent weeks  Violence Risk to Self: Yes- Within the past 6 months  Current homicidal or violent thoughts toward another: Yes- Within the past 6 months (Patient states that he worries he will murder someone but then denies thoughts to harm others.)  Description of current thoughts/plans:: N/A  Previous Plans to Harm Another Person: No  Description of violent thoughts or behaviors toward others:: Patient states that he worries he will murder someone but then denies thoughts to harm others.  Violence Risk to Others: Yes- Within the last 6 months (Patient states that he worries " "he will murder someone but then denies thoughts to harm others.)  Previous History of Violence to Others: No  Provisional Diagnosis  Axis I: Major Depressive Disorder    Lester was seen today for comprehensive psychiatric assessment.  At the time of interview Lester is calm, polite, and cooperative with the interview.  He appears constricted in his affect. During today's examination, Lester does not exhibit objective evidence of beatris/hypomania or psychosis. Lester is not currently irritable, grandiose, labile, or pathologically euphoric. Lester denies perceptual disturbances, such as A/V hallucinations, and does not endorse paranoia, ideas of reference, or delusional beliefs. Lester denies recent alcohol or recreational substance abuse.    Today, Lester confirms the aforementioned information.  He reports that he has been struggling with prominent symptoms of depression that have been progressively worsening over the past 6 months in the setting of life stressors.  Current stressors include family stressors, relationship stressors, job stressors.  Lester reports that his parents  in 2016 and following this he moved with his father to [place] to live in [place]. Lester reports that his father is a hardworking and caring individual, however is \"overbearing\" and Lester reports \"I live in the shadow of my father.\" Lester reports that he finds it difficult to connect with his father, stating that his father \"always wants to be involved\" and he reports that this causes tension in their relationship. Lester feels more comfortable talking to his mother, however he feels a sense of distance from his mother as she lives in [place]. He finds himself in the middle of his parents disagreements.  Lester reports that he recently ended a relationship about 2 weeks ago (this relationship lasted almost 2 years) with his former girlfriend due to both of " "them struggling with anxiety. He states \"we were both having panic attacks and it wasn't working out.\"   Lester reports that he finds his job stressful.  He works as a  for [employer] and he reports dealing with the personalities of the patrons to be frustrating.    In the context of the aforementioned stressors, Lester reports that over the past 6 months his symptoms of depression and anxiety have sharply worsened.  He reports that he has been struggling with sleep (this is also exacerbated by working the night shift), decreased life interest (he does enjoy reading in his free time), prominent feelings of hopelessness (stating that he feels \"hollow\"), reports decreased energy.  He reports no significant changes to his appetite or concentration.  He reports over the past 6 months he has been experiencing suicidal ideation with plan to stab himself with a knife or jump off of a parking garage with the intent to die. He endorses vague homicidal ideations (stating he worries he will harm someone, however denies any plan or intent to act on these thoughts).    Lester denies any acute or chronic history suggestive of an underlying affective (bipolar) organization. Lester denies previous episodes of elevated/expansive mood, lengthy periods without sleep, grandiosity, or intense and prolonged irritability. Lester denies atypical periods of increased goal-directed behavior, excessive spending, or sexual promiscuity. The patient has no history of pathologic impulsivity or extreme mood lability.    At the time of interview, Lester does report that, in the context of depression, he experiences internal monologue \"that I shouldn't be waking up in the morning.\"  At the time of interview he does not endorse visual or auditory hallucinations.    Following a thorough discussion, the patient was started on Lexapro 10 mg daily for symptoms of depression and Abilify 2 mg daily for augmentation " "of antidepressant.    Medical Review Of Systems:  Complete review of systems is negative except as noted above.    Psychiatric Review Of Systems:  Problems with sleep: Patient reports decreased sleep (exacerbated by working the night shift)  Appetite changes: Patient denies changes to appetite  Weight changes: Patient denies changes to weight  Low energy/anergy: Patient reports decreased energy  Low interest/pleasure/anhedonia: Patient reports decreased life interest  Somatic symptoms: Patient denies somatic symptoms  Anxiety/panic: Patient reports increased anxiety in the setting of aforementioned life stressors.  He reports that he does experience panic attacks on a regular basis where he feels \"like I am having a heart attack\" and he experiences feelings of impending doom, chest pain, palpitations, and shortness of breath  Lo: Patient denies any history of lo or hypomania  Guilt/hopeless: Patient reports prominent feelings of hopelessness (stating that he feels \"hollow\")  Self injurious behavior/risky behavior: Patient does report self-harm behaviors.  Patient reports that he recently cut himself in the chest area in response to ongoing anxiety.    Lester denies any acute or chronic history suggestive of an underlying affective (bipolar) organization. Lester denies previous episodes of elevated/expansive mood, lengthy periods without sleep, grandiosity, or intense and prolonged irritability. Stanislawer denies atypical periods of increased goal-directed behavior, excessive spending, or sexual promiscuity. The patient has no history of pathologic impulsivity or extreme mood lability.    At the time of interview, Lester does report that, in the context of depression, he experiences internal monologue \"that I shouldn't be waking up in the morning.\"  At the time of interview he does not endorse visual or auditory hallucinations.    Historical Information     Psychiatric History:   Prior " psychiatric diagnoses: In the past the patient has been diagnosed with unspecified depression and anxiety  Inpatient hospitalizations: Patient has never been hospitalized for mental health  Suicide attempts: Patient denies suicide attempts  Self-harm behaviors: Patient does report self-harm behaviors.  Patient reports that he recently cut himself in the chest area in response to ongoing anxiety.  Violent behavior: Patient denies violent behavior  Outpatient treatment: Patient has never seen a psychiatrist outpatient.  He has seen counselors through high school.  He has not seen any therapist since high school.  Psychiatric medication trial: Patient has never been trialed on psychiatric medication    Substance Abuse History:  Social History     Tobacco Use    Smoking status: Never    Smokeless tobacco: Never   Vaping Use    Vaping status: Never Used   Substance Use Topics    Alcohol use: Not Currently    Drug use: Not Currently      Patient denies use of tobacco, alcohol, or illicit drugs.   I have assessed this patient for substance use within the past 12 months.    Family Psychiatric History:   Patient reports that there are no members in his family to his knowledge that been formally diagnosed with mental health issues  Patient reports that his father, aunt, and uncle likely have struggled with depression and anxiety    Social History  Marital history: Single  Children: None  Living arrangement: Patient is currently living with his father in [facility] [place]  Functioning Relationships: Patient has a good support system and reports his father is supportive  Education: Patient completed high school and some college (he briefly studied a few semesters of psychology before leaving)  Occupational History: Currently works as a  for [employer]  Other Pertinent History: None  Guns: Lester Maher denies access to guns or firearms  Seizures: Lester denies history of seizures    Traumatic  "History:   Abuse: Lester denies history of physical, emotional, or sexual abuse  Other Traumatic Events: none reported    Past Medical History:   Past Medical History:   Diagnosis Date    No pertinent past medical history     RSV infection         -----------------------------------  Objective    Temp:  [97.9 °F (36.6 °C)-98.6 °F (37 °C)] 97.9 °F (36.6 °C)  HR:  [66-87] 87  Resp:  [16-17] 17  BP: ()/(54-70) 95/54    Mental Status Exam:  Appearance:  alert, good eye contact, appears stated age, casually dressed, and appropriate grooming and hygiene   Behavior:  calm and cooperative   Motor: no abnormal movements and normal gait and balance   Speech:  spontaneous, clear, normal rate, normal volume, and coherent   Mood:  \"Hollow\"   Affect:  Constricted   Thought Process:  Organized, logical, goal-directed   Thought Content: no verbalized delusions or overt paranoia, negative thoughts   Perceptual disturbances: denies current hallucinations and does not appear to be responding to internal stimuli at this time   Risk Potential: Suicidal Ideation with plan to jump off a parking garage or cut himself with a knife, Homicidal Ideation without plan   Cognition: oriented to self and situation, memory grossly intact, appears to be of average intelligence, normal abstract reasoning, age-appropriate attention span and concentration, and cognition not formally tested   Insight:  Fair   Judgment: Limited       Meds/Allergies   No Known Allergies  all current active meds have been reviewed and current meds:   Current Facility-Administered Medications   Medication Dose Route Frequency    acetaminophen (TYLENOL) tablet 650 mg  650 mg Oral Q6H PRN    acetaminophen (TYLENOL) tablet 650 mg  650 mg Oral Q4H PRN    acetaminophen (TYLENOL) tablet 975 mg  975 mg Oral Q6H PRN    ARIPiprazole (ABILIFY) tablet 2 mg  2 mg Oral Daily    benztropine (COGENTIN) injection 1 mg  1 mg Intramuscular Q4H PRN Max 6/day    benztropine " (COGENTIN) tablet 1 mg  1 mg Oral Q4H PRN Max 6/day    hydrOXYzine HCL (ATARAX) tablet 50 mg  50 mg Oral Q6H PRN Max 4/day    Or    diphenhydrAMINE (BENADRYL) injection 50 mg  50 mg Intramuscular Q6H PRN    escitalopram (LEXAPRO) tablet 10 mg  10 mg Oral Daily    hydrOXYzine HCL (ATARAX) tablet 100 mg  100 mg Oral Q6H PRN Max 4/day    Or    LORazepam (ATIVAN) injection 2 mg  2 mg Intramuscular Q6H PRN    hydrOXYzine HCL (ATARAX) tablet 25 mg  25 mg Oral Q6H PRN Max 4/day    OLANZapine (ZyPREXA) tablet 5 mg  5 mg Oral Q4H PRN Max 3/day    Or    OLANZapine (ZyPREXA) IM injection 2.5 mg  2.5 mg Intramuscular Q4H PRN Max 3/day    OLANZapine (ZyPREXA) tablet 5 mg  5 mg Oral Q3H PRN Max 3/day    Or    OLANZapine (ZyPREXA) IM injection 5 mg  5 mg Intramuscular Q3H PRN Max 3/day    OLANZapine (ZyPREXA) tablet 2.5 mg  2.5 mg Oral Q4H PRN Max 6/day    propranolol (INDERAL) tablet 10 mg  10 mg Oral Q8H PRN    senna-docusate sodium (SENOKOT S) 8.6-50 mg per tablet 1 tablet  1 tablet Oral Daily PRN    traZODone (DESYREL) tablet 50 mg  50 mg Oral HS PRN       Behavioral Health Medications: all current active meds have been reviewed. Changes as in Plan section above.    Laboratory results:  I have personally reviewed all pertinent laboratory/tests results.  Recent Results (from the past 48 hour(s))   Rapid drug screen, urine    Collection Time: 07/27/24 11:08 AM   Result Value Ref Range    Amph/Meth UR Negative Negative    Barbiturate Ur Negative Negative    Benzodiazepine Urine Negative Negative    Cocaine Urine Negative Negative    Methadone Urine Negative Negative    Opiate Urine Negative Negative    PCP Ur Negative Negative    THC Urine Negative Negative    Oxycodone Urine Negative Negative    Fentanyl Urine Negative Negative    HYDROCODONE URINE Negative Negative   POCT alcohol breath test    Collection Time: 07/27/24 11:13 AM   Result Value Ref Range    EXTBreath Alcohol 0.000    Comprehensive metabolic panel    Collection  Time: 07/28/24  5:35 AM   Result Value Ref Range    Sodium 140 135 - 147 mmol/L    Potassium 3.8 3.5 - 5.3 mmol/L    Chloride 106 96 - 108 mmol/L    CO2 26 21 - 32 mmol/L    ANION GAP 8 4 - 13 mmol/L    BUN 9 5 - 25 mg/dL    Creatinine 0.85 0.60 - 1.30 mg/dL    Glucose 77 65 - 140 mg/dL    Glucose, Fasting 77 65 - 99 mg/dL    Calcium 9.3 8.4 - 10.2 mg/dL    AST 12 (L) 13 - 39 U/L    ALT 6 (L) 7 - 52 U/L    Alkaline Phosphatase 54 34 - 104 U/L    Total Protein 6.4 6.4 - 8.4 g/dL    Albumin 4.6 3.5 - 5.0 g/dL    Total Bilirubin 3.23 (H) 0.20 - 1.00 mg/dL    eGFR 125 ml/min/1.73sq m   CBC and differential    Collection Time: 07/28/24  5:35 AM   Result Value Ref Range    WBC 6.34 4.31 - 10.16 Thousand/uL    RBC 4.42 3.88 - 5.62 Million/uL    Hemoglobin 13.4 12.0 - 17.0 g/dL    Hematocrit 39.8 36.5 - 49.3 %    MCV 90 82 - 98 fL    MCH 30.3 26.8 - 34.3 pg    MCHC 33.7 31.4 - 37.4 g/dL    RDW 12.1 11.6 - 15.1 %    MPV 9.8 8.9 - 12.7 fL    Platelets 222 149 - 390 Thousands/uL    nRBC 0 /100 WBCs    Segmented % 58 43 - 75 %    Immature Grans % 0 0 - 2 %    Lymphocytes % 32 14 - 44 %    Monocytes % 8 4 - 12 %    Eosinophils Relative 2 0 - 6 %    Basophils Relative 0 0 - 1 %    Absolute Neutrophils 3.67 1.85 - 7.62 Thousands/µL    Absolute Immature Grans 0.01 0.00 - 0.20 Thousand/uL    Absolute Lymphocytes 2.05 0.60 - 4.47 Thousands/µL    Absolute Monocytes 0.48 0.17 - 1.22 Thousand/µL    Eosinophils Absolute 0.11 0.00 - 0.61 Thousand/µL    Basophils Absolute 0.02 0.00 - 0.10 Thousands/µL   TSH, 3rd generation with Free T4 reflex    Collection Time: 07/28/24  5:35 AM   Result Value Ref Range    TSH 3RD GENERATON 0.620 0.450 - 4.500 uIU/mL   Lipid Panel with Direct LDL reflex    Collection Time: 07/28/24  5:35 AM   Result Value Ref Range    Cholesterol 124 See Comment mg/dL    Triglycerides 49 See Comment mg/dL    HDL, Direct 44 >=40 mg/dL    LDL Calculated 70 0 - 100 mg/dL        Imaging Studies:   No orders to display             -----------------------------------    Risks / Benefits of Treatment:  Risks, benefits, and possible side effects of medications were explained to patient. The patient was able to verbalize understanding and agree for treatment.     Counseling / Coordination of Care:  Patient's presentation on admission and proposed treatment plan were discussed with the treatment team.  Diagnosis, medication changes and treatment plan were reviewed with the patient.  Recent stressors were discussed with the patient.  Events leading to admission were reviewed with the patient.  Importance of medication and treatment compliance was reviewed with the patient.            Inpatient Psychiatric Certification:     Certification: Based upon physical, mental and social evaluations, I certify that inpatient psychiatric services are medically necessary for this patient for a duration of 7 midnights for the treatment of MDD (major depressive disorder), recurrent episode, moderate (HCC). Available alternative community resources do not meet the patient's mental health care needs. I further attest that an established written individualized plan of care has been implemented and is outlined in the patient's medical records.      Lester Ochoa MD

## 2024-07-28 NOTE — NURSING NOTE
"Patient observed within the milieu, however mostly withdrawn to self. Brightens somewhat on approach. During assessment he reports that he has been struggling with anxiety and panic attacks for 8 years if he \"really thinks about it\" however reports that it has only become overwhelming for the last 2-3 years. When asked about suicidal ideations he reports that \"I've had days where I wish I didn't wake up\" but states he has no intent or current suicidal thoughts. When asked about homicidal ideations he denies any at this time. Patient reports AH however states \"I don't really know if it's my psyche or inner voice, but the voice says mean things sometimes and comments on my circumstances\". Patient did not appear internally preoccupied during assessment. Denies visual hallucinations. Offered PRN anxiety medication for \"first day jitters\" but declined. Q7 minute checks ongoing.  "

## 2024-07-29 LAB
ALBUMIN SERPL BCG-MCNC: 4.8 G/DL (ref 3.5–5)
ALP SERPL-CCNC: 55 U/L (ref 34–104)
ALT SERPL W P-5'-P-CCNC: 6 U/L (ref 7–52)
AST SERPL W P-5'-P-CCNC: 12 U/L (ref 13–39)
BILIRUB DIRECT SERPL-MCNC: 0.42 MG/DL (ref 0–0.2)
BILIRUB SERPL-MCNC: 3.21 MG/DL (ref 0.2–1)
PROT SERPL-MCNC: 7.3 G/DL (ref 6.4–8.4)

## 2024-07-29 PROCEDURE — 80076 HEPATIC FUNCTION PANEL: CPT

## 2024-07-29 PROCEDURE — 99232 SBSQ HOSP IP/OBS MODERATE 35: CPT | Performed by: HOSPITALIST

## 2024-07-29 RX ORDER — ERGOCALCIFEROL 1.25 MG/1
50000 CAPSULE ORAL WEEKLY
Status: DISCONTINUED | OUTPATIENT
Start: 2024-07-29 | End: 2024-08-05 | Stop reason: HOSPADM

## 2024-07-29 RX ADMIN — ESCITALOPRAM OXALATE 10 MG: 10 TABLET ORAL at 08:37

## 2024-07-29 RX ADMIN — CYANOCOBALAMIN TAB 500 MCG 1000 MCG: 500 TAB at 11:46

## 2024-07-29 RX ADMIN — ERGOCALCIFEROL 50000 UNITS: 1.25 CAPSULE, LIQUID FILLED ORAL at 11:46

## 2024-07-29 RX ADMIN — ARIPIPRAZOLE 2 MG: 2 TABLET ORAL at 08:37

## 2024-07-29 NOTE — PROGRESS NOTES
"Progress Note - Lester Maher 18 y.o. male MRN: [id number]    Unit/Bed#: [facility] 218-02 Encounter: [id number]        Subjective:   Patient seen and examined at bedside after reviewing the chart and discussing the case with the caring staff.      Patient examined at bedside.  Patient has no acute symptoms.    From 7/28, Vit D 12.4 and Vit B12 215.  Hepatic panel pending.     Physical Exam   Vitals: Blood pressure 99/50, pulse 71, temperature 98.7 °F (37.1 °C), temperature source Temporal, resp. rate 18, height 5' 10.5\" (1.791 m), weight 64.8 kg (142 lb 12.8 oz), SpO2 96%.,Body mass index is 20.2 kg/m².  Constitutional: Patient in no acute distress.  HEENT: PERR, EOMI, MMM.  Cardiovascular: Normal rate and regular rhythm.    Pulmonary/Chest: Effort normal and breath sounds normal.   Abdomen: Soft, + BS, NT.    Assessment/Plan:  Lester Maher is a(n) 18 y.o. male with MDD.     Arthralgia/headache.  Tylenol as needed.  Insomnia.  Trazodone nightly as needed.  Elevated bilirubin.  Level 3.23 on 7/28/24.  No prior labs.  No GI symptoms.  Check hepatic panel in AM.   Vitamin D deficiency.  Start patient on Vit D2 50,000 units weekly x 10 weeks then Vit D3 1,000 units daily.  Vitamin B12 deficiency.  Start patient on daily Vit B12 supplement.     The patient was discussed with Dr. Bingham and he is in agreement with the above note.  "

## 2024-07-29 NOTE — PROGRESS NOTES
07/29/24   Team Meeting   Meeting Type Daily Rounds   Team Members Present   Team Members Present Physician;;Nurse;Occupational Therapist   Physician Team Member Dr Divina ANGLIN; Omega MARTINEZ; Huong CHAVIS; Dr Imer ANGLIN   Nursing Team Member Pau RN; Lizzy CAST; Gonzalo CAST   Social Work Team Member Jose SALAZAR   OT Team Member Pope RAJI   Patient/Family Present   Patient Present No   Patient's Family Present No     New 201, Si to jump off parking garage or slit throat, possible AH vs own monologue telling him things, inconsistent, endorsing hallucinations but does not appear internally preoccupied, no dc date.

## 2024-07-29 NOTE — PROGRESS NOTES
Progress Note - Behavioral Health     Lester Maher 18 y.o. male MRN: [id number]   Unit/Bed#: [facility] 218-02 Encounter: [id number]      Documentation, nursing notes, medication reconciliation, labs, and vitals reviewed. Patient was seen for continuing care and reviewed with treatment team. No acute events over the past 24 hours.   Lester seen today for psychiatric follow-up visit.  Patient was seen in private in the conference room.  Patient reporting that he is somewhat less anxious and depressed compared with day of admission but however endorses still having passive SI without any plan to harm himself.  Reporting that he had approximately 2-year relationship with his girlfriend in which he broke off a couple weeks ago which she feels exacerbated his symptoms.  Reports no prior psychiatric history or psychotropic medications.  Denies any HI or AVH.  Does not appear preoccupied or paranoid.  Denies any medication side effects with the Abilify and Lexapro and is medication compliant.    Sleep: slept off and on  Appetite: normal  Medication side effects: No   ROS: all other systems are negative    Mental Status Evaluation:    Appearance:  age appropriate, casually dressed, adequate grooming   Behavior:  pleasant, cooperative, calm   Speech:  normal rate and volume   Mood:  depressed, anxious   Affect:  brighter   Thought Process:  organized, logical, coherent, goal directed   Associations: intact associations   Thought Content:  no overt delusions   Perceptual Disturbances: denies auditory or visual hallucinations when asked, does not appear responding to internal stimuli   Risk Potential: Suicidal ideation - Yes, passive death wish without any plan  Homicidal ideation - None  Potential for aggression - No   Sensorium:  oriented to person, place, time/date, and situation   Memory:  recent and remote memory grossly intact   Consciousness:  alert and awake   Attention: attention span and concentration are age  appropriate   Insight:  fair   Judgment: limited   Gait/Station: normal gait/station   Motor Activity: no abnormal movements     Vital signs in last 24 hours:    Temp:  [97.9 °F (36.6 °C)-98.7 °F (37.1 °C)] 98.7 °F (37.1 °C)  HR:  [71-72] 71  Resp:  [18] 18  BP: ()/(50-51) 99/50    Laboratory results: I have personally reviewed all pertinent laboratory/tests results.  Most Recent Labs:   Lab Results   Component Value Date    WBC 6.34 07/28/2024    RBC 4.42 07/28/2024    HGB 13.4 07/28/2024    HCT 39.8 07/28/2024     07/28/2024    RDW 12.1 07/28/2024    NEUTROABS 3.67 07/28/2024    SODIUM 140 07/28/2024    K 3.8 07/28/2024     07/28/2024    CO2 26 07/28/2024    BUN 9 07/28/2024    CREATININE 0.85 07/28/2024    GLUC 77 07/28/2024    GLUF 77 07/28/2024    CALCIUM 9.3 07/28/2024    AST 12 (L) 07/29/2024    ALT 6 (L) 07/29/2024    ALKPHOS 55 07/29/2024    TP 7.3 07/29/2024    ALB 4.8 07/29/2024    TBILI 3.21 (H) 07/29/2024    CHOLESTEROL 124 07/28/2024    HDL 44 07/28/2024    TRIG 49 07/28/2024    LDLCALC 70 07/28/2024    [id number] 0.620 07/28/2024         Progress Toward Goals: progressing slowly    Assessment & Plan   Principal Problem:    MDD (major depressive disorder), recurrent episode, moderate (HCC)  Active Problems:    Generalized anxiety disorder with panic attacks    Recommended Treatment:     Planned medication and treatment changes:    All current active medications have been reviewed  Encourage group therapy, milieu therapy and occupational therapy  Behavioral Health checks every 7 minutes  No medication changes today    Requires continued inpatient treatment due to chronic illness and high risk of decompensation if discharged before long term stability is achieved.      Current Facility-Administered Medications   Medication Dose Route Frequency Provider Last Rate    acetaminophen  650 mg Oral Q6H PRN Lester Ochoa MD      acetaminophen  650 mg Oral Q4H PRN Lester  Mele Ochoa MD      acetaminophen  975 mg Oral Q6H PRN Lester Ochoa MD      ARIPiprazole  2 mg Oral Daily Lester Ochoa MD      benztropine  1 mg Intramuscular Q4H PRN Max 6/day Lester Ochoa MD      benztropine  1 mg Oral Q4H PRN Max 6/day Lester Ochoa MD      cyanocobalamin  1,000 mcg Oral Daily Anne Dalton PA-C      hydrOXYzine HCL  50 mg Oral Q6H PRN Max 4/day Lester Ochoa MD      Or    diphenhydrAMINE  50 mg Intramuscular Q6H PRN Lester Ochoa MD      ergocalciferol  50,000 Units Oral Weekly Anne Dalton PA-C      escitalopram  10 mg Oral Daily Lester Ochoa MD      hydrOXYzine HCL  100 mg Oral Q6H PRN Max 4/day Lester Ochoa MD      Or    LORazepam  2 mg Intramuscular Q6H PRN Lester Ochoa MD      hydrOXYzine HCL  25 mg Oral Q6H PRN Max 4/day eLster Ochoa MD      OLANZapine  5 mg Oral Q4H PRN Max 3/day Lester Ochoa MD      Or    OLANZapine  2.5 mg Intramuscular Q4H PRN Max 3/day Lester Ochoa MD      OLANZapine  5 mg Oral Q3H PRN Max 3/day Lester Ochoa MD      Or    OLANZapine  5 mg Intramuscular Q3H PRN Max 3/day Lester Ochoa MD      OLANZapine  2.5 mg Oral Q4H PRN Max 6/day Lester Ochoa MD      propranolol  10 mg Oral Q8H PRN Lester Ochoa MD      senna-docusate sodium  1 tablet Oral Daily PRN Lester Ochoa MD      traZODone  50 mg Oral HS PRN Lester Ochoa MD         Risks / Benefits of Treatment:    Risks, benefits, and possible side effects of medications explained to patient and patient verbalizes understanding and agreement for treatment.    Counseling / Coordination of Care:    Total floor / unit time spent today 30 minutes. Greater than 50% of total time was spent with the patient and / or family counseling and / or coordination of care.  A description of counseling / coordination of care:  Patient's progress discussed with staff in treatment team meeting.  Medications, treatment progress and treatment plan reviewed with patient.    Mike Borja PA-C 07/29/24

## 2024-07-29 NOTE — PROGRESS NOTES
07/29/24 1300   Team Meeting   Meeting Type Tx Team Meeting   Initial Conference Date 07/29/24   Next Conference Date 08/29/24   Team Members Present   Team Members Present Physician;Nurse;   Physician Team Member Dr. Murcia   Nursing Team Member Judi Ball RN   Care Management Team Member Oliva Werner RN   Patient/Family Present   Patient Present Yes   Patient's Family Present No     Initial Plan  Treatment Team met and reviewed patient strengths, limitations, coping skills, Treatment Plan and Goals; patient reported understanding and agreement and signed the Treatment Plan document. A copy was placed on the chart.

## 2024-07-29 NOTE — PROGRESS NOTES
"Psycho Social  Patient was admitted to [facility] on 7/27/24 under a 201, Voluntary Commitment with reported bizarre thought process, AH/VH of 'husks and ghosts', Si without a plan. On interview, the patient reported sx. \"Off and On\" for an unspecified timeframe, though described \"VH\" in terms of feelings of 'unreality' when speaking with people. Did state he has occasionally seen \"someone passing\" that wasn't there. Unable to identify contributing factors.  Current SI: Denied  Current HI: Denied  AVH: Vaguely reported having heard 'something' earlier today  Depression:Mild  Anxiety:        Mild  Strengths: Cooperative, reasoning ability, physical health, employment, housing, able to negotiate needs  Stressors/Limitations: Limited insight; limited support; no current providers  Coping skills: Doing magic tricks, playing darts, pool, archery, writing, hiking  HX Mental Health: No formal rx  Past Hospitalizations: None  Medication Compliance: None  SA/SI in last 12 months: Ideation without plan  HI/violence towards others in last 12 months: Denied  Access to Firearms: Denied  Hx abuse/trauma: Self trauma by cutting on one occasion to \"express feelings.\"  Family HX Mental Health: Feels some family members are 'unofficially diagnosed.'   Family HX Suicide/Homicide: Denied  Family HX Substance Abuse: Denied  Family HX Dementia: Denied  Substance Abuse: Denied  Smoking Cessation: Denied  Legal Issues: Denied  Marital Status: Never  Sexual Preference: Experimental  Children: None  Parents:Lives with father. 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 Previously tutoring in Sign Language  [place] HX:  None  Confucianism Preference: None reported  Cultural needs:  None reported  Financial: $4,000- $8,000/month   POA/guardianship/advanced: directives: Denied  Pharmacy: [facility]/ [place]    Housing " "Stability-Dispo/211: Denied  Transportation:  Drives  Food Insecurity:  None  Intimate Partner Violence: NA  Utilities:  No issue    Psychiatrist: Agreeable to a referral  Therapist:      Agreeable to a referral  PCP:      Dr. Shagufta Mackay  D&A:      NA  Case Management: None  Family Contact:  Lito Mejia: [phone]     07/29/24 0795   Patient Intake   Living Arrangement House;Lives with someone   Can patient return home? Yes   Address to be Discharge to: See facesheet   Patient's Telephone Number See facesheet   Access to Firearms No   Type of Work    Work History Part-time   School Grade/Year College freshman   Admission Status   Status of Admission 201   County [place] Residence [place]   Patient History   Presenting Problems A/V hallucinations/ SI without a plan; past h/o cutting   Treatment History HS Counselor; \"casual conversation with \"   Currently in Treatment No   Medical Problems See Medical H&P   Legal Issues None   Substance Abuse No   Crisis Info   Release of Information Signed Yes  (Psych, PCP, Father)       "

## 2024-07-29 NOTE — NURSING NOTE
CM note    Admit diagnosis weakness, syncope, Covid, flu. Followed by the PCP and [facility].     She worked with PT and OT, non daily skilled therapy is recommended. I talked with the patient about the discharge plan. She is agreeable to have [facility] services. A referral has been sent to [facility], orders for RN/PT/OT/HHA has been sent. The patient said she does not have transportation home, she requested an ambulance. CM yesterday talked with her about the cost of the ambulance. .    Patient observed within the milieu, reports he has started on medication this evening and is interested to monitor effectiveness. Observed intermittently socializing with peers. Denies SI/HI and hallucinations at this time. Denies issues with sleep or appetite. Q7 minute checks ongoing.

## 2024-07-29 NOTE — PROGRESS NOTES
Met with patient completed his self assessment. He was feeling suicidal and it was impacting his job. Patient identified he has guilt, resentments and anger that he will need to address. He is looking forward to getting on the right meds and receiving outpatient services.

## 2024-07-29 NOTE — CASE MANAGEMENT
CM placed call to patient's PCP, Dr. Mackay, [phone] for admission notification. VM prompt indicated that phone number and address had changed. Call was automatically terminated prior to completion. Will try at a later time.

## 2024-07-29 NOTE — PLAN OF CARE
Problem: Ineffective Coping  Goal: Cooperates with admission process  Description: Interventions:   - Complete admission process  Outcome: Completed   Patient completed the admission self assessment and has been active in group therapy

## 2024-07-29 NOTE — PLAN OF CARE
Problem: DISCHARGE PLANNING - CARE MANAGEMENT  Goal: Discharge to post-acute care or home with appropriate resources  Description: INTERVENTIONS:  - Conduct assessment to determine patient/family and health care team treatment goals, and need for post-acute services based on payer coverage, community resources, and patient preferences, and barriers to discharge  - Address psychosocial, clinical, and financial barriers to discharge as identified in assessment in conjunction with the patient/family and health care team  - Arrange appropriate level of post-acute services according to patient’s   needs and preference and payer coverage in collaboration with the physician and health care team  - Communicate with and update the patient/family, physician, and health care team regarding progress on the discharge plan  - Arrange appropriate transportation to post-acute venues  Reactivated     New 201, goal initiated.

## 2024-07-29 NOTE — PLAN OF CARE
Problem: Depression  Goal: Refrain from isolation  Description: Interventions:  - Develop a trusting relationship   - Encourage socialization   Outcome: Progressing

## 2024-07-29 NOTE — NURSING NOTE
"Patient med and meal compliant visible on unit social with peers pleasant cooperative. Patient states he is feeling better since admission but still experiencing \"strange thoughts, that don't mean anything\" but does not elaborate at this time, states \"oh its probably just normal\" Patients father called for updates would like to be part of discharge planning expressed being very concerned and feels helpless of situation. JOIE signed for father, Lito Maher. Patient also stated his mother is visiting this week from [place] but appeared confused when she called the unit then stated, \"well I guess no one was expecting me to do this\" Patient also endorses mild fleeting AH but then states he's not sure if that's really what it is. Patient signed 72/hr notice today 7/29/24 at 1732, states \"I'm just not feeling this\"  Patient denies SI HI VH at this time Safety checks maintained.  "

## 2024-07-30 PROCEDURE — 99232 SBSQ HOSP IP/OBS MODERATE 35: CPT | Performed by: NURSE PRACTITIONER

## 2024-07-30 RX ORDER — ARIPIPRAZOLE 5 MG/1
5 TABLET ORAL DAILY
Status: DISCONTINUED | OUTPATIENT
Start: 2024-07-31 | End: 2024-08-01

## 2024-07-30 RX ORDER — ESCITALOPRAM OXALATE 10 MG/1
20 TABLET ORAL DAILY
Status: CANCELLED | OUTPATIENT
Start: 2024-07-30

## 2024-07-30 RX ADMIN — CYANOCOBALAMIN TAB 500 MCG 1000 MCG: 500 TAB at 08:12

## 2024-07-30 RX ADMIN — ARIPIPRAZOLE 2 MG: 2 TABLET ORAL at 08:12

## 2024-07-30 RX ADMIN — ESCITALOPRAM OXALATE 10 MG: 10 TABLET ORAL at 08:12

## 2024-07-30 NOTE — NURSING NOTE
Patient compliant with meds and meals. Patient denies all but cont to have moderate to severe anxiety and appears depressed. Patient is cooperative and pleasant. Withdrawn to self and room. Minimal with peers. Q 7 min behavioral and safety checks in place.

## 2024-07-30 NOTE — PROGRESS NOTES
"Progress Note - Behavioral Health   Lester Maher 18 y.o. male MRN: [id number]  Unit/Bed#: [facility] 218-02 Encounter: [id number]    Assessment & Plan   Principal Problem:    MDD (major depressive disorder), recurrent episode, moderate (HCC)  Active Problems:    Generalized anxiety disorder with panic attacks      Behavior over the last 24 hours:  unchanged  Sleep: normal  Appetite: normal  Medication side effects: No  ROS: no complaints and all other systems are negative    Devan was seen today with attending psychiatrist for follow-up.  Presents as bizarre and disheveled.  Endorses improving depression and ongoing anxiety.  Describes episodes of \"spacing out\" where he is unsure if he is experiencing auditory hallucinations; added AH do not influence his behaviors.  Describes thoughts as intrusive,and at times ego dystonic. Sometimes \"curious\" of his own thoughts.  Adamantly denies homicidal ideation.  Stated if his plans were to go south, he does have an alternate plan, \"but that was before coming into the hospital, not now.\"  Agreeable to rescind 72-hour notice for further medication adjustment assessment of safety.    Mental Status Evaluation:  Appearance:  Thin  male, disheveled, bearded, wearing glasses and blanket draped over shoulders   Behavior:  [place] bright, bizarre, redirectable   Speech:  Hyperverbal   Mood:  \"Great\"   Affect:  Expansive   Thought Process:  circumstantial and illogical   Associations: circumstantial associations   Thought Content:  Intrusive thoughts   Perceptual Disturbances: Questionable AH described as negative commentary, appears intermittently preoccupied   Risk Potential: Suicidal Ideations none at present  Homicidal Ideations none  Potential for Aggression Not at present   Sensorium:  person, place, time/date, and situation   Memory:  recent and remote memory grossly intact   Consciousness:  alert and awake    Attention: attention span appeared shorter than expected for " age   Insight:  limited   Judgment: limited   Gait/Station: normal gait/station and normal balance   Motor Activity: no abnormal movements     Progress Toward Goals: Appears to be minimizing depressive symptoms, but has been maintaining safety with no return of SI/HI.  Suspect patient is internally preoccupied.  Endorses intrusive thoughts that at times are ego dystonic.  Plan is to increase Abilify 5 mg daily for mood stabilization, thought disorder, and treatment of depression.  Rescinded 72-hour notice.  No discharge date at this time.    Recommended Treatment: Continue with group therapy, milieu therapy and occupational therapy.      Risks, benefits and possible side effects of Medications:   Lester has limited understanding of risk versus benefits of medications, but agrees to take as prescribed.    Medications:   Increase Abilify 5 mg PO QD  all current active meds have been reviewed and current meds:   Current Facility-Administered Medications   Medication Dose Route Frequency    acetaminophen (TYLENOL) tablet 650 mg  650 mg Oral Q6H PRN    acetaminophen (TYLENOL) tablet 650 mg  650 mg Oral Q4H PRN    acetaminophen (TYLENOL) tablet 975 mg  975 mg Oral Q6H PRN    [START ON 7/31/2024] ARIPiprazole (ABILIFY) tablet 5 mg  5 mg Oral Daily    benztropine (COGENTIN) injection 1 mg  1 mg Intramuscular Q4H PRN Max 6/day    benztropine (COGENTIN) tablet 1 mg  1 mg Oral Q4H PRN Max 6/day    cyanocobalamin (VITAMIN B-12) tablet 1,000 mcg  1,000 mcg Oral Daily    hydrOXYzine HCL (ATARAX) tablet 50 mg  50 mg Oral Q6H PRN Max 4/day    Or    diphenhydrAMINE (BENADRYL) injection 50 mg  50 mg Intramuscular Q6H PRN    ergocalciferol (VITAMIN D2) capsule 50,000 Units  50,000 Units Oral Weekly    escitalopram (LEXAPRO) tablet 10 mg  10 mg Oral Daily    hydrOXYzine HCL (ATARAX) tablet 100 mg  100 mg Oral Q6H PRN Max 4/day    Or    LORazepam (ATIVAN) injection 2 mg  2 mg Intramuscular Q6H PRN    hydrOXYzine HCL (ATARAX) tablet  25 mg  25 mg Oral Q6H PRN Max 4/day    OLANZapine (ZyPREXA) tablet 5 mg  5 mg Oral Q4H PRN Max 3/day    Or    OLANZapine (ZyPREXA) IM injection 2.5 mg  2.5 mg Intramuscular Q4H PRN Max 3/day    OLANZapine (ZyPREXA) tablet 5 mg  5 mg Oral Q3H PRN Max 3/day    Or    OLANZapine (ZyPREXA) IM injection 5 mg  5 mg Intramuscular Q3H PRN Max 3/day    OLANZapine (ZyPREXA) tablet 2.5 mg  2.5 mg Oral Q4H PRN Max 6/day    propranolol (INDERAL) tablet 10 mg  10 mg Oral Q8H PRN    senna-docusate sodium (SENOKOT S) 8.6-50 mg per tablet 1 tablet  1 tablet Oral Daily PRN    traZODone (DESYREL) tablet 50 mg  50 mg Oral HS PRN   .    Labs: I have personally reviewed all pertinent laboratory/tests results.     Counseling / Coordination of Care  Total floor / unit time spent today 30 minutes. Greater than 50% of total time was spent with the patient and / or family counseling and / or coordination of care. A description of the counseling / coordination of care: Medication education, treatment plan, safety planning

## 2024-07-30 NOTE — PROGRESS NOTES
07/30/24    Team Meeting   Meeting Type Daily Rounds   Team Members Present   Team Members Present Physician;;Nurse;Occupational Therapist   Physician Team Member Dr Diivna ANGLIN; Omega MARTINEZ; Dr Imer ANGLIN   Nursing Team Member Pau RN; Lizzy CAST   Social Work Team Member Jose SALAZAR   OT Team Member Pope RAJI   Patient/Family Present   Patient Present No   Patient's Family Present No     201, signed 72 hr yest 1732, dc home with OP psych; signed [place] for parents, superficial, mild SI yest, passive SI currently, no dc date.

## 2024-07-30 NOTE — DISCHARGE INSTR - OTHER ORDERS
You are being discharged to [facility] [place] [address]. Patient phone: [phone].     Triggers you have identified during your hospitalization that led to your admission of a distressed mood includes unstable mental health and ineffective coping skills. Coping skills you have identified during your hospitalization include music and art therapy. If you are unable to deal with your distressed mood alone please contact Lito Maher (Father) [phone]. If that is not effective and you continue to have a distressed mood, are overwhelmed, or in crisis, please contact (Crisis #) [facility] 3 , dial 321 or go to the nearest emergency center.      *[facility] Crisis Hotline: 775.475.4842  *[place] Suicide Prevention Lifeline:  1-590.415.7799  *Alcohol Anonymous: 949.501.3213  *[place] Drug & Alcohol Commission: (535) 815-7004  *National [place] on Mental Illness (FISH) HELPLINE: 548.774.4073/Website: www.fish.org  *Substance Abuse and Mental Health Services Administration([facility]) National Helpline, which is a confidential, free, 24-hour-a-day, 365-day-a-year, information service for individuals and family members facing mental health and/or substance use disorders. This service provides referrals to local treatment facilities, support groups, and community-based organizations. Callers can also order free publications and other information.  Call 1-337.782.3961/Website: www.[facility].gov  *United Way 2-1-1: This is a toll free, confidential, 24-hour-a-day service which connects you to a community  in your area who can help you find services and resources that are available to you locally and provide critical services that can improve and save lives.  Call: 211  /Website: http://www."[employer].org/     You declined the need for drug & alcohol treatment and a primary care provider appointment at this time.       robert Keating, our Behavioral Health Nurse Navigators,  will be calling you after your discharge, on the phone number that you provided.  They will be available as an additional support, if needed.   If you wish to speak with one of them, you may contact Zuri at [phone] or Little at [phone].      Outpatient Drug & Alcohol Treatment   The [facility] currently operates an outpatient treatment unit:  [facility] in [place], [place] as a functional unit of the [facility]  The Outpatient treatment units are licensed by the [place] Department of Drug & Alcohol Programs to provide individual and group counseling for those with substance abuse and dependency problems. The clinical staff is experienced in a variety of therapeutic modalities and provides treatment that is individualized to meet the particular needs of each person. These units are drug-free treatment programs.  The [facility] accepts most major healthcare insurance coverage plans, [place] Medical Assistance and in those cases where the consumer has no third party healthcare coverage, a liberal sliding fee schedule is utilized. The length of service and type of outpatient service provided is based on the results of the Level of Care Assessment            There are currently three treatment protocols available:  Outpatient  Intensive Outpatient  Contracted services for Partial Hospitalization  Therapy is provided in both Individual and Group counseling formats. The Outpatient department offers individual counseling for the family members of substance abusers to address co-dependent and enabling behaviors.    Outpatient treatment services in [facility] are purchased through a fee-for-service subcontract with:  [place] [facility]  [address] [place] [address]   Phone: [phone]    [facility]  [facility]  [place] [address]   New Admissions [phone]  Local office [phone]    Outpatient treatment services  "in [facility] are purchased through fee-for-service subcontracts with:  [facility]   [address] [facility]  [place], [place] [address]  Phone: [phone]  [facility]  [address]  [place] [address]  Phone: New Admissions [phone] / Local Office [phone]  Outpatient treatment services are also available to [facility] residents in our [facility] Office.     March 24, 2020   [facility] Food Pantries   1. [place] serves households in [facility] and is located in [employer], [address] [facility]. Food distribution is the third Thursday of each month from 6 to 7 p.m. For more information, contact Yulissa Cota at [phone].   2.  [facility] serves households in the [facility] and is located in [address]. Food distribution is the second Saturday of each month from 9 to 11 a.m.     For more information, contact Mansi Andersen at [phone] or the Rev. Mannie Gotti at [phone].   3.  [facility] serves households in [place] and is located in [facility], [address] [facility]. Food distribution is the third Monday of each month from 9:30 to 11 a.m.     For more information, contact Momo Ribeiro at [phone].   4.  [facility] serves households in [place]; [facility] and [facility]; [place] and the [employer], [facility], and is located in [facility], [address] Food distribution is every Tuesday from 10 a.m. to 3 p.m.     For more information, contact Kandace Velásquez or Oliva Gonsalez at [phone].   5.  [facility] offers a food pantry through [employer] \"[employer].\" The pantry is located at [address] [place] [address]. Food distributions take place the second Wednesday of " each month between 4 and 7 p.m. and the fourth Saturday of each month between 9 a.m. and noon. For more information, call [phone].   6.  [place] [facility] serves households in [facility] and [facility] and is located in [facility], [address]. Food distribution is the third Thursday of each month from 1 to 3:30 p.m.     For more information, contact Jennifer Morse at [phone] or Zoey Cisneros at [phone].   7.  [place] [facility] serves households in [facility] and [facility], and the [facility] and is located in [employer], [facility]. Food distribution is the fourth Friday of each month from 8:30 a.m. to 3:30 p.m.    For more information, contact Ginger Sheets at [phone], or Prashant Enrique at [phone].   8.  [place] [facility] serves households in [place] and is located in the [facility],  [facility]. Food distribution is the fourth Tuesday of each month from 10 a.m. to noon.     For more information, contact Zuri Alcocer at [phone], or Mele Stephenson at [phone].   9.  [place] [facility] serves households in [facility] and is located in the [facility], [facility]. Food distribution is from January to October, the fourth Saturday of each month from 9 to 11 a.m. and for November and December, the third Saturday of each month from 9 to 11 a.m.     For more information, contact Whitney Mitchell at [phone].   10.  [place] Area serves households in [facility] and [facility] and [facility] and is located in [facility], [address] [place] [address]. Food distribution is the second Tuesday of each month from 2 to 4 p.m.     For more information, contact Ladarius Bañuelos at [phone] or  the Rev. Max Vallejo at [phone].      [place] [address]  [phone]  [facility]al [facility] Walk-In Center, located at the [facility], offers a welcoming and comfortable, non-residential environment for those dealing with a variety of mental health issues.  Those seeking services or support for a non-life-threatening mental health circumstance will be greeted by a  and will be assessed by a professional crisis intervention specialist in a relaxed, non-clinical environment. Individuals will be evaluated and provided with the resources and/or referrals needed to deal with the immediate situation. This may include psychotherapy sessions or connections other community resources - such as [facility] -- and specialists. A  may be assigned to provide ongoing support.  As this is considered the least restrictive environment for mental health services, the walk-in center is not the ideal option for anyone who is experiencing an extreme mental health crisis. Individuals who are experiencing that level of distress are encouraged to seek immediate medical attention at a hospital emergency room.    Virtual Therapy Resources: please complete an online assessment to find a provider  [facility]   [email or web address]     [employer]   [place]  [address]   P: [phone]   [email or web address]

## 2024-07-30 NOTE — PLAN OF CARE
Problem: Depression  Goal: Treatment Goal: Demonstrate behavioral control of depressive symptoms, verbalize feelings of improved mood/affect, and adopt new coping skills prior to discharge  Outcome: Progressing   See chart note

## 2024-07-30 NOTE — NURSING NOTE
Patient withdrawn to room. Appears depressed. Minimal. Denies marily SI/Hi,AV/H,anxiety or depression. No PRN's. Q 7 continuous monitoring maintained.

## 2024-07-30 NOTE — SOCIAL WORK
"Contact made to Lito Maher (father) [phone]; progress update provided. Lito states pt's first SI was when he was in middle school after moving to local [place], mother lives in [place] (manipulative divorce which pt carried a lot of the burden). Lito never got sense pt would actually go through with hurting himself. Family appears supportive of pt's tx, is encouraging pt to stay in tx. States pt is very private, is social only in social settings, is introverted, withdrawn to self until in social setting then becomes a people person. Lito has no safety concerns for pt safety, does not feel pt would ever hurt himself, rather thinks pt I sad and angry and does not know what to do, how to do things in life (move out and live on his own, paying bills). Lito talked to nursing yesterday, is supportive of pt tx, \"trusts the process\". Call ended mutually.   "

## 2024-07-30 NOTE — SOCIAL WORK
Sw attempted to meet with pt for check in, pt attending group and actively participating at this time.

## 2024-07-30 NOTE — PROGRESS NOTES
"Progress Note - Lester Maher 18 y.o. male MRN: [id number]    [facility] Encounter: [id number]        Subjective:   Patient seen and examined at bedside after reviewing the chart and discussing the case with the caring staff.      Patient examined at bedside.  Patient has no acute symptoms.    Patient's LFTs from 7/29/2024 showed AST 12 with ALT 6 which is unchanged from 7/28/2024.  Patient's direct bilirubin was 0.42.    Physical Exam   Vitals: Blood pressure 113/66, pulse 80, temperature 98.1 °F (36.7 °C), temperature source Temporal, resp. rate 18, height 5' 10.5\" (1.791 m), weight 64.8 kg (142 lb 12.8 oz), SpO2 97%.,Body mass index is 20.2 kg/m².  Constitutional: Patient in no acute distress.  HEENT: PERR, EOMI, MMM.  Cardiovascular: Normal rate and regular rhythm.    Pulmonary/Chest: Effort normal and breath sounds normal.   Abdomen: Soft, + BS, NT.    Assessment/Plan:  Lester Maher is a(n) 18 y.o. male with MDD.     Arthralgia/headache.  Tylenol as needed.  Insomnia.  Trazodone nightly as needed.  Elevated bilirubin.  Level 3.21 on 7/29/2024 as compared to 3.23 on 7/28/24.  Direct bilirubin was 0.42 7/29/2024.  No prior labs.  No GI symptoms.  Patient's AST ALT are 12 and 6,  7/29/2024.  Vitamin D deficiency.  Start patient on Vit D2 50,000 units weekly x 10 weeks then Vit D3 1,000 units daily.  Vitamin B12 deficiency.  Start patient on daily Vit B12 supplement.   "

## 2024-07-31 PROCEDURE — 99232 SBSQ HOSP IP/OBS MODERATE 35: CPT | Performed by: NURSE PRACTITIONER

## 2024-07-31 RX ADMIN — ESCITALOPRAM OXALATE 10 MG: 10 TABLET ORAL at 08:13

## 2024-07-31 RX ADMIN — ARIPIPRAZOLE 5 MG: 5 TABLET ORAL at 08:13

## 2024-07-31 RX ADMIN — CYANOCOBALAMIN TAB 500 MCG 1000 MCG: 500 TAB at 08:13

## 2024-07-31 NOTE — SOCIAL WORK
"Sw met with pt for check in, pt denies current Si/HI/AVH/dep/anx, reports feeling \"bored\", is dc focused. Reports positive experience in group therapy. Support, reassurance provided. Pt continues to agree to OP psych upon dc.   "

## 2024-07-31 NOTE — NUTRITION
07/31/24 1431   Biochemical Data,Medical Tests, and Procedures   Biochemical Data/Medical Tests/Procedures Lab values reviewed;Meds reviewed   Labs (Comment) 7/29 AST:12, ALT:6, amanda:3.21. 7/28 Vit D:12.4   Meds (Comment) cogentin, Vit B12, Vit D, lexapro, atarax, ativan, zyprexa, inderal, desyrel   Nutrition-Focused Physical Exam   Nutrition-Focused Physical Exam Findings RN skin assessment reviewed;No skin issues documented   Medical-Related Concerns RSV   Adequacy of Intake   Nutrition Modality PO   Feeding Route   PO Independent   Current PO Intake   Current Diet Order Regular diet thin liquids   Current Meal Intake 50-75%;%   Estimated calorie intake compared to estimated need Nutrient needs met.   PES Statement   Problem No nutrition diagnosis   Recommendations/Interventions   Malnutrition/BMI Present No  (does not meet criteria)   Summary Weight change, 2-13#; MST-1. Regular diet thin liquids. Meal completions %. Reports good appetite. Reports no diet plan. Consumes 2-3 meals per day. Cooks or had takeout. Resides with father. 7/27/#; 10/15/#. Reports possible 10# weight loss recently. Cannot validate at this time. Will monitor weight status.  Skin intact.   Interventions/Recommendations Continue current diet order   Education Assessment   Education Education not indicated at this time   Nutrition Complexity Risk   Nutrition complexity level Low risk   Follow up date 08/09/24

## 2024-07-31 NOTE — PROGRESS NOTES
07/31/24   Team Meeting   Meeting Type Daily Rounds   Team Members Present   Team Members Present Physician;;Nurse;Occupational Therapist   Physician Team Member Dr Divina ANGLIN; Omega MARTINEZ; Dr Willams RN   Nursing Team Member Pau RN; Lizzy CAST; Mary JAIME   Social Work Team Member Jose FERNÁNDEZW; Javan MSW   OT Team Member Pope RAJI   Patient/Family Present   Patient Present No   Patient's Family Present No     201, rescinded 72hr notice yest, dc early next week, will dc home with OP; visible, minimal, guarded, appears paranoid, abilify 5mg, meds adjusted, bizarre, possible psychosis before admission unclear, denies current SI, illogical at times but becoming more linear, dc focused.

## 2024-07-31 NOTE — PLAN OF CARE
Problem: Ineffective Coping  Goal: Identifies healthy coping skills  Outcome: Progressing     Problem: Depression  Goal: Refrain from isolation  Description: Interventions:  - Develop a trusting relationship   - Encourage socialization   Outcome: Progressing   Patient attends groups interacting with his peers.

## 2024-07-31 NOTE — PROGRESS NOTES
"Progress Note - Behavioral Health   Lester Maher 18 y.o. male MRN: [id number]  Unit/Bed#: -02 Encounter: [id number]    Assessment & Plan   Principal Problem:    MDD (major depressive disorder), recurrent episode, moderate (HCC)  Active Problems:    Generalized anxiety disorder with panic attacks      Behavior over the last 24 hours:  unchanged  Sleep: normal  Appetite: Fair  Medication side effects: No  ROS: no complaints and all other systems are negative    Devan has been visible and participatory milieu activities.  Continues to present as bizarre and guarded.  Thoughts illogical at times and described as no longer intrusive or ruminating.  Describes depression as \"good\" with decreased anxiety.  No return of SI and has been maintaining safety on unit.  Elaborated he was curious about suicidal ideations \"on the scientific side.\"  Verbalizes desire for discharge.  Unable to provide 5-year plan and stated \"can we maybe talk about 3-year plan?\"  Malodorous and disheveled in presentation.    Mental Status Evaluation:  Appearance:  Thin  male, unkempt hair, disheveled, malodorous and wearing glasses   Behavior:  Bizarre, guarded, restless and fidgety , superficial   Speech:  normal pitch and normal volume   Mood:  \"Bored\"   Affect:  mood-congruent and anxious   Thought Process:  circumstantial and illogical   Associations: circumstantial associations   Thought Content:  Some grandiosity, less ruminating/less intrusive thoughts   Perceptual Disturbances: Denied AVH, although appeared intermittently preoccupied   Risk Potential: Suicidal Ideations none at present  Homicidal Ideations none  Potential for Aggression Not at present   Sensorium:  person, place, time/date, and situation   Memory:  recent and remote memory grossly intact   Consciousness:  alert and awake    Attention: attention span appeared shorter than expected for age   Insight:  limited   Judgment: limited   Gait/Station: normal " gait/station and normal balance   Motor Activity: no abnormal movements     Progress Toward Goals: Maintaining safety with no return of SI.  Bizarre, restless, and appeared intermittently preoccupied.  Thoughts illogical.  Some grandiosity present regarding his job and wanting to write a book.  Tolerating titration of Abilify 5 mg daily well.  Plan is to further titrate for ongoing mood stabilization, suspected psychosis, and residual depression.  Will continue to assess for safety throughout the weekend.  Potential discharge early to mid next week.    Recommended Treatment: Continue with group therapy, milieu therapy and occupational therapy.      Risks, benefits and possible side effects of Medications:   Lester has limited understanding of risk versus benefits of medications, but agrees to take as prescribed.    Medications: all current active meds have been reviewed, continue current psychiatric medications, and current meds:   Current Facility-Administered Medications   Medication Dose Route Frequency    acetaminophen (TYLENOL) tablet 650 mg  650 mg Oral Q6H PRN    acetaminophen (TYLENOL) tablet 650 mg  650 mg Oral Q4H PRN    acetaminophen (TYLENOL) tablet 975 mg  975 mg Oral Q6H PRN    ARIPiprazole (ABILIFY) tablet 5 mg  5 mg Oral Daily    benztropine (COGENTIN) injection 1 mg  1 mg Intramuscular Q4H PRN Max 6/day    benztropine (COGENTIN) tablet 1 mg  1 mg Oral Q4H PRN Max 6/day    cyanocobalamin (VITAMIN B-12) tablet 1,000 mcg  1,000 mcg Oral Daily    hydrOXYzine HCL (ATARAX) tablet 50 mg  50 mg Oral Q6H PRN Max 4/day    Or    diphenhydrAMINE (BENADRYL) injection 50 mg  50 mg Intramuscular Q6H PRN    ergocalciferol (VITAMIN D2) capsule 50,000 Units  50,000 Units Oral Weekly    escitalopram (LEXAPRO) tablet 10 mg  10 mg Oral Daily    hydrOXYzine HCL (ATARAX) tablet 100 mg  100 mg Oral Q6H PRN Max 4/day    Or    LORazepam (ATIVAN) injection 2 mg  2 mg Intramuscular Q6H PRN    hydrOXYzine HCL (ATARAX)  tablet 25 mg  25 mg Oral Q6H PRN Max 4/day    OLANZapine (ZyPREXA) tablet 5 mg  5 mg Oral Q4H PRN Max 3/day    Or    OLANZapine (ZyPREXA) IM injection 2.5 mg  2.5 mg Intramuscular Q4H PRN Max 3/day    OLANZapine (ZyPREXA) tablet 5 mg  5 mg Oral Q3H PRN Max 3/day    Or    OLANZapine (ZyPREXA) IM injection 5 mg  5 mg Intramuscular Q3H PRN Max 3/day    OLANZapine (ZyPREXA) tablet 2.5 mg  2.5 mg Oral Q4H PRN Max 6/day    propranolol (INDERAL) tablet 10 mg  10 mg Oral Q8H PRN    senna-docusate sodium (SENOKOT S) 8.6-50 mg per tablet 1 tablet  1 tablet Oral Daily PRN    traZODone (DESYREL) tablet 50 mg  50 mg Oral HS PRN   .    Labs: I have personally reviewed all pertinent laboratory/tests results.     Counseling / Coordination of Care  Total floor / unit time spent today 30 minutes. Greater than 50% of total time was spent with the patient and / or family counseling and / or coordination of care. A description of the counseling / coordination of care: Medication education, treatment plan, safety planning

## 2024-07-31 NOTE — NURSING NOTE
Patient is visible in milieu with peers, medication compliant, pleasant upon approach and cooperative. Patient denies all, no anxiety, no depression, no SI/HI or hallucinations. Patient attended group therapy and interacted appropriately with peers. Continued care and continual safety checks in progress.

## 2024-07-31 NOTE — PROGRESS NOTES
"Progress Note - Lester Maher 18 y.o. male MRN: [id number]    Unit/Bed#: [facility] 218-02 Encounter: [id number]        Subjective:   Patient seen and examined at bedside after reviewing the chart and discussing the case with the caring staff.      Patient examined at bedside.  Patient has no acute symptoms.      Physical Exam   Vitals: Blood pressure 99/53, pulse 73, temperature 98.5 °F (36.9 °C), temperature source Temporal, resp. rate 18, height 5' 10.5\" (1.791 m), weight 64.8 kg (142 lb 12.8 oz), SpO2 96%.,Body mass index is 20.2 kg/m².  Constitutional: Patient in no acute distress.  HEENT: PERR, EOMI, MMM.  Cardiovascular: Normal rate and regular rhythm.    Pulmonary/Chest: Effort normal and breath sounds normal.   Abdomen: Soft, + BS, NT.    Assessment/Plan:  Lester Maher is a(n) 18 y.o. male with MDD.     Arthralgia/headache.  Tylenol as needed.  Insomnia.  Trazodone nightly as needed.  Elevated bilirubin.  Total bili 3.21, direct bili 0.42 on 7/29.  AST 12. ALT 6. No prior labs.  No GI symptoms.   Vitamin D deficiency.  Start patient on Vit D2 50,000 units weekly x 10 weeks then Vit D3 1,000 units daily.  Vitamin B12 deficiency.  Start patient on daily Vit B12 supplement.     The patient was discussed with Dr. Bingham and he is in agreement with the above note.    "

## 2024-07-31 NOTE — NURSING NOTE
Patient visible on unit. Calm and cooperative. Appears flat and depressed. Medication compliant. Withdrawn to self. Safety checks continue Q 7 minutes.

## 2024-08-01 PROCEDURE — 99232 SBSQ HOSP IP/OBS MODERATE 35: CPT | Performed by: HOSPITALIST

## 2024-08-01 RX ORDER — ARIPIPRAZOLE 10 MG/1
10 TABLET ORAL DAILY
Status: DISCONTINUED | OUTPATIENT
Start: 2024-08-02 | End: 2024-08-05 | Stop reason: HOSPADM

## 2024-08-01 RX ADMIN — ARIPIPRAZOLE 5 MG: 5 TABLET ORAL at 08:09

## 2024-08-01 RX ADMIN — CYANOCOBALAMIN TAB 500 MCG 1000 MCG: 500 TAB at 08:09

## 2024-08-01 RX ADMIN — ESCITALOPRAM OXALATE 10 MG: 10 TABLET ORAL at 08:09

## 2024-08-01 NOTE — PROGRESS NOTES
"Progress Note - Lester Maher 18 y.o. male MRN: [id number]    Unit/Bed#: [facility] 218-02 Encounter: [id number]        Subjective:   Patient seen and examined at bedside after reviewing the chart and discussing the case with the caring staff.      Patient examined at bedside.  Patient has no acute symptoms.       Physical Exam   Vitals: Blood pressure 107/67, pulse 61, temperature 97.6 °F (36.4 °C), temperature source Temporal, resp. rate 16, height 5' 11\" (1.803 m), weight 64.8 kg (142 lb 12.8 oz), SpO2 97%.,Body mass index is 19.92 kg/m².  Constitutional: Patient in no acute distress.  HEENT: PERR, EOMI, MMM.  Cardiovascular: Normal rate and regular rhythm.    Pulmonary/Chest: Effort normal and breath sounds normal.   Abdomen: Soft, + BS, NT.    Assessment/Plan:  Lester Maher is a(n) 18 y.o. male with MDD.     Arthralgia/headache.  Tylenol as needed.  Insomnia.  Trazodone nightly as needed.  Elevated bilirubin.  Total bili 3.21, direct bili 0.42 on 7/29.  AST 12. ALT 6. No prior labs.  No GI symptoms.   Vitamin D deficiency.  Start patient on Vit D2 50,000 units weekly x 10 weeks then Vit D3 1,000 units daily.  Vitamin B12 deficiency.  Start patient on daily Vit B12 supplement.     The patient was discussed with Dr. Bingham and he is in agreement with the above note.    "

## 2024-08-01 NOTE — NURSING NOTE
Patient visible on unit. Pleasant, calm and cooperative. Medication compliant. Withdrawn to self. Denies SI,HI,AVH, anxiety and depression. Safety checks continue Q 7 minutes.

## 2024-08-01 NOTE — PLAN OF CARE
Problem: Ineffective Coping  Goal: Identifies ineffective coping skills  Outcome: Progressing  Goal: Identifies healthy coping skills  Outcome: Progressing     Problem: Depression  Goal: Treatment Goal: Demonstrate behavioral control of depressive symptoms, verbalize feelings of improved mood/affect, and adopt new coping skills prior to discharge  Outcome: Progressing  Goal: Refrain from isolation  Description: Interventions:  - Develop a trusting relationship   - Encourage socialization   Outcome: Progressing  Goal: Refrain from self-neglect  Outcome: Progressing     Problem: Anxiety  Goal: Anxiety is at manageable level  Description: Interventions:  - Assess and monitor patient's anxiety level.   - Monitor for signs and symptoms (heart palpitations, chest pain, shortness of breath, headaches, nausea, feeling jumpy, restlessness, irritable, apprehensive).   - Collaborate with interdisciplinary team and initiate plan and interventions as ordered.  - [place] patient to unit/surroundings  - Explain treatment plan  - Encourage participation in care  - Encourage verbalization of concerns/fears  - Identify coping mechanisms  - Assist in developing anxiety-reducing skills  - Administer/offer alternative therapies  - Limit or eliminate stimulants  Outcome: Progressing     Problem: Nutrition/Hydration-ADULT  Goal: Nutrient/Hydration intake appropriate for improving, restoring or maintaining nutritional needs  Description: Monitor and assess patient's nutrition/hydration status for malnutrition. Collaborate with interdisciplinary team and initiate plan and interventions as ordered.  Monitor patient's weight and dietary intake as ordered or per policy. Utilize nutrition screening tool and intervene as necessary. Determine patient's food preferences and provide high-protein, high-caloric foods as appropriate.     INTERVENTIONS:  - Monitor oral intake, urinary output, labs, and treatment plans  - Assess nutrition and hydration  status and recommend course of action  - Evaluate amount of meals eaten  - Assist patient with eating if necessary   - Allow adequate time for meals  - Recommend/ encourage appropriate diets, oral nutritional supplements, and vitamin/mineral supplements  - Order, calculate, and assess calorie counts as needed  - Recommend, monitor, and adjust tube feedings and TPN/PPN based on assessed needs  - Assess need for intravenous fluids  - Provide specific nutrition/hydration education as appropriate  - Include patient/family/caregiver in decisions related to nutrition  Outcome: Progressing

## 2024-08-01 NOTE — PROGRESS NOTES
"Progress Note - Behavioral Health   Lester Maher 18 y.o. male MRN: [id number]  Unit/Bed#: -02 Encounter: [id number]    Assessment & Plan   Principal Problem:    MDD (major depressive disorder), recurrent episode, moderate (HCC)  Active Problems:    Generalized anxiety disorder with panic attacks      Behavior over the last 24 hours:  improved  Sleep: normal  Appetite: normal  Medication side effects: No  ROS: no complaints and all other systems are negative    Devan has been visible, social, and participatory milieu activities.  Presents with improving hygiene.  Calm and cooperative during today's encounter; less bizarre.  Endorses significant decrease in intrusive/ruminating thoughts.  Expressed his desire to seek care for treatment of anxiety, depression, and panic attacks prior to having suicidal thoughts.  Describes his friend Zaire whom he went to high school and family as strong protective factors against suicide.  Maintaining safety on unit with no return of SI.  Thoughts were more organized and less illogical today.    Mental Status Evaluation:  Appearance:  Thin  male, bearded, shaggy dark hair, improving hygiene   Behavior:  Cooperative, less bizarre, good eye contact   Speech:  normal pitch and normal volume   Mood:  \"All right,: Kind of bored\"   Affect:  constricted, mood-congruent, and redirectable   Thought Process:  goal directed and less illogical   Associations: circumstantial associations   Thought Content:  Less ruminating/less intrusive   Perceptual Disturbances: Denied AVH, did not appear internally preoccupied today   Risk Potential: Suicidal Ideations none  Homicidal Ideations none  Potential for Aggression No   Sensorium:  person, place, time/date, and situation   Memory:  recent and remote memory grossly intact   Consciousness:  alert and awake    Attention: attention span and concentration were age appropriate   Insight:  Improving   Judgment: Improving "   Gait/Station: normal gait/station and normal balance   Motor Activity: no abnormal movements     Progress Toward Goals: Slowly improving.  Less bizarre and more logical today.  Reports significant decrease in intrusive and ruminating thoughts.  Maintaining safety with no return of SI.  Able to identify adequate safety plan and protective factors against suicide.  Requested further titration of Abilify for mood stabilization and treatment of depression which she had been struggling with prior to admission.  Will increase Abilify 10 mg daily and continue with Lexapro 10 mg daily.  Anticipated discharge early next week should patient continue to maintain safety and mood control throughout the weekend.    Recommended Treatment: Continue with group therapy, milieu therapy and occupational therapy.      Risks, benefits and possible side effects of Medications:   Devan has limited understanding of risk versus benefits of medications, but agrees to take as prescribed.    Medications:   Increase Abilify 10 mg PO QD  all current active meds have been reviewed and current meds:   Current Facility-Administered Medications   Medication Dose Route Frequency    acetaminophen (TYLENOL) tablet 650 mg  650 mg Oral Q6H PRN    acetaminophen (TYLENOL) tablet 650 mg  650 mg Oral Q4H PRN    acetaminophen (TYLENOL) tablet 975 mg  975 mg Oral Q6H PRN    [START ON 8/2/2024] ARIPiprazole (ABILIFY) tablet 10 mg  10 mg Oral Daily    benztropine (COGENTIN) injection 1 mg  1 mg Intramuscular Q4H PRN Max 6/day    benztropine (COGENTIN) tablet 1 mg  1 mg Oral Q4H PRN Max 6/day    cyanocobalamin (VITAMIN B-12) tablet 1,000 mcg  1,000 mcg Oral Daily    hydrOXYzine HCL (ATARAX) tablet 50 mg  50 mg Oral Q6H PRN Max 4/day    Or    diphenhydrAMINE (BENADRYL) injection 50 mg  50 mg Intramuscular Q6H PRN    ergocalciferol (VITAMIN D2) capsule 50,000 Units  50,000 Units Oral Weekly    escitalopram (LEXAPRO) tablet 10 mg  10 mg Oral Daily    hydrOXYzine  HCL (ATARAX) tablet 100 mg  100 mg Oral Q6H PRN Max 4/day    Or    LORazepam (ATIVAN) injection 2 mg  2 mg Intramuscular Q6H PRN    hydrOXYzine HCL (ATARAX) tablet 25 mg  25 mg Oral Q6H PRN Max 4/day    OLANZapine (ZyPREXA) tablet 5 mg  5 mg Oral Q4H PRN Max 3/day    Or    OLANZapine (ZyPREXA) IM injection 2.5 mg  2.5 mg Intramuscular Q4H PRN Max 3/day    OLANZapine (ZyPREXA) tablet 5 mg  5 mg Oral Q3H PRN Max 3/day    Or    OLANZapine (ZyPREXA) IM injection 5 mg  5 mg Intramuscular Q3H PRN Max 3/day    OLANZapine (ZyPREXA) tablet 2.5 mg  2.5 mg Oral Q4H PRN Max 6/day    propranolol (INDERAL) tablet 10 mg  10 mg Oral Q8H PRN    senna-docusate sodium (SENOKOT S) 8.6-50 mg per tablet 1 tablet  1 tablet Oral Daily PRN    traZODone (DESYREL) tablet 50 mg  50 mg Oral HS PRN   .    Labs: I have personally reviewed all pertinent laboratory/tests results.     Counseling / Coordination of Care  Total floor / unit time spent today 30 minutes. Greater than 50% of total time was spent with the patient and / or family counseling and / or coordination of care. A description of the counseling / coordination of care: Medication education, treatment plan, safety planning

## 2024-08-02 ENCOUNTER — TELEPHONE (OUTPATIENT)
Age: 19
End: 2024-08-02

## 2024-08-02 PROCEDURE — 99232 SBSQ HOSP IP/OBS MODERATE 35: CPT | Performed by: NURSE PRACTITIONER

## 2024-08-02 RX ORDER — TRAZODONE HYDROCHLORIDE 100 MG/1
100 TABLET ORAL
Status: DISCONTINUED | OUTPATIENT
Start: 2024-08-02 | End: 2024-08-05 | Stop reason: HOSPADM

## 2024-08-02 RX ADMIN — HYDROXYZINE HYDROCHLORIDE 100 MG: 50 TABLET, FILM COATED ORAL at 20:59

## 2024-08-02 RX ADMIN — ARIPIPRAZOLE 10 MG: 10 TABLET ORAL at 08:12

## 2024-08-02 RX ADMIN — CYANOCOBALAMIN TAB 500 MCG 1000 MCG: 500 TAB at 08:12

## 2024-08-02 RX ADMIN — ESCITALOPRAM OXALATE 10 MG: 10 TABLET ORAL at 08:12

## 2024-08-02 NOTE — PLAN OF CARE
Problem: Ineffective Coping  Goal: Identifies healthy coping skills  Outcome: Progressing   Patient is active in group therapy

## 2024-08-02 NOTE — PROGRESS NOTES
Met with patient completed his relapse prevention. He was able to identify his protective factors,warning signs,stressors, coping skills and support people. We reviewed the community supports. He is looking forward to discharge on Monday.

## 2024-08-02 NOTE — SOCIAL WORK
Sw met with pt for dc planning, called [facility] together to schedule intake (P [phone]), spoke to Precious who scheduled pt for intake in person 8/8 10am. No telehealth coverage for this pt.     [facility] appt for med mgmt canceled via [facility].

## 2024-08-02 NOTE — PROGRESS NOTES
"Progress Note - Behavioral Health   Lester Maher 18 y.o. male MRN: [id number]  Unit/Bed#: [facility] 218-02 Encounter: [id number]    Assessment & Plan   Principal Problem:    MDD (major depressive disorder), recurrent episode, moderate (HCC)  Active Problems:    Generalized anxiety disorder with panic attacks      Behavior over the last 24 hours:  improved  Sleep: normal  Appetite: normal  Medication side effects: No  ROS: no complaints and all other systems are negative    Devan remains visible, social, and engaged in milieu activities.  Reports improved anxiety and depression and presents with congruent affect.  Maintaining safety with no return of SI.  Has been free from panic attacks while inpatient.  Verbalizes desire to return to work and communicate more with family and friends regarding his mental health.  Denies return of intrusive/ruminating thoughts.  Thought process is linear, logical, and able to reality test.  Identifies adequate safety plan and protective factors against suicide.  Gentle prompting required for showering.    Mental Status Evaluation:  Appearance:  bearded and thin  male, casually dressed, malodorous   Behavior:  Cooperative, good eye contact   Speech:  normal pitch and normal volume   Mood:  \"Pretty good\"   Affect:  mood-congruent and appropriate   Thought Process:  goal directed and linear, forward thinking   Associations: intact associations   Thought Content:  No overt delusions or paranoia, denies intrusive/ruminating thoughts   Perceptual Disturbances: Denied AVH, did not appear internally preoccupied   Risk Potential: Suicidal Ideations none  Homicidal Ideations none  Potential for Aggression No   Sensorium:  person, place, time/date, and situation   Memory:  recent and remote memory grossly intact   Consciousness:  alert and awake    Attention: attention span and concentration were age appropriate   Insight:  Improving   Judgment: Improving   Gait/Station: normal " gait/station and normal balance   Motor Activity: no abnormal movements     Progress Toward Goals: Improving.  Maintaining safety with no return of SI.  Thoughts no longer ruminating/intrusive.  Endorses improved anxiety/depression and presents with congruent affect.  Identifies adequate safety plan/protective factors.  Tolerating titration of Abilify 10 mg well with no adverse effects.  Plan is to continue to assess safety and ongoing improvement throughout the weekend.  Anticipated discharge 8/5/2024.    Recommended Treatment: Continue with group therapy, milieu therapy and occupational therapy.      Risks, benefits and possible side effects of Medications:   Risks, benefits, and possible side effects of medications explained to patient and patient verbalizes understanding.      Medications: all current active meds have been reviewed, continue current psychiatric medications, and current meds:   Current Facility-Administered Medications   Medication Dose Route Frequency    acetaminophen (TYLENOL) tablet 650 mg  650 mg Oral Q6H PRN    acetaminophen (TYLENOL) tablet 650 mg  650 mg Oral Q4H PRN    acetaminophen (TYLENOL) tablet 975 mg  975 mg Oral Q6H PRN    ARIPiprazole (ABILIFY) tablet 10 mg  10 mg Oral Daily    benztropine (COGENTIN) injection 1 mg  1 mg Intramuscular Q4H PRN Max 6/day    benztropine (COGENTIN) tablet 1 mg  1 mg Oral Q4H PRN Max 6/day    cyanocobalamin (VITAMIN B-12) tablet 1,000 mcg  1,000 mcg Oral Daily    hydrOXYzine HCL (ATARAX) tablet 50 mg  50 mg Oral Q6H PRN Max 4/day    Or    diphenhydrAMINE (BENADRYL) injection 50 mg  50 mg Intramuscular Q6H PRN    ergocalciferol (VITAMIN D2) capsule 50,000 Units  50,000 Units Oral Weekly    escitalopram (LEXAPRO) tablet 10 mg  10 mg Oral Daily    hydrOXYzine HCL (ATARAX) tablet 100 mg  100 mg Oral Q6H PRN Max 4/day    Or    LORazepam (ATIVAN) injection 2 mg  2 mg Intramuscular Q6H PRN    hydrOXYzine HCL (ATARAX) tablet 25 mg  25 mg Oral Q6H PRN Max 4/day     OLANZapine (ZyPREXA) tablet 5 mg  5 mg Oral Q4H PRN Max 3/day    Or    OLANZapine (ZyPREXA) IM injection 2.5 mg  2.5 mg Intramuscular Q4H PRN Max 3/day    OLANZapine (ZyPREXA) tablet 5 mg  5 mg Oral Q3H PRN Max 3/day    Or    OLANZapine (ZyPREXA) IM injection 5 mg  5 mg Intramuscular Q3H PRN Max 3/day    OLANZapine (ZyPREXA) tablet 2.5 mg  2.5 mg Oral Q4H PRN Max 6/day    propranolol (INDERAL) tablet 10 mg  10 mg Oral Q8H PRN    senna-docusate sodium (SENOKOT S) 8.6-50 mg per tablet 1 tablet  1 tablet Oral Daily PRN    traZODone (DESYREL) tablet 100 mg  100 mg Oral HS PRN   .    Labs: I have personally reviewed all pertinent laboratory/tests results.     Counseling / Coordination of Care  Total floor / unit time spent today 30 minutes. Greater than 50% of total time was spent with the patient and / or family counseling and / or coordination of care. A description of the counseling / coordination of care: Medication education, treatment plan, safety/discharge planning

## 2024-08-02 NOTE — NURSING NOTE
Patient was pleasant and cooperative. Patient social with staff and peers. Staff support provided. Q 7 minute safety checks maintained. Patient denied SI/HI during assessment. Patient compliant with medications and groups. Patient reports feeling much better. Staff will continue to monitor and support.

## 2024-08-02 NOTE — TELEPHONE ENCOUNTER
Pt was scheduled with psych on 10/10/2024 @1PM with Dr. Guzman at our [place] office for a [facility] NP.  Confirmed via secure chat with .

## 2024-08-02 NOTE — PLAN OF CARE
Problem: DISCHARGE PLANNING - CARE MANAGEMENT  Goal: Discharge to post-acute care or home with appropriate resources  Description: INTERVENTIONS:  - Conduct assessment to determine patient/family and health care team treatment goals, and need for post-acute services based on payer coverage, community resources, and patient preferences, and barriers to discharge  - Address psychosocial, clinical, and financial barriers to discharge as identified in assessment in conjunction with the patient/family and health care team  - Arrange appropriate level of post-acute services according to patient’s   needs and preference and payer coverage in collaboration with the physician and health care team  - Communicate with and update the patient/family, physician, and health care team regarding progress on the discharge plan  - Arrange appropriate transportation to post-acute venues  Outcome: Completed   Dc home with OP [facility] med mgmt appt; declined need for pcp, D&A. Resources provided.

## 2024-08-02 NOTE — PROGRESS NOTES
"Progress Note - Lester Maher 18 y.o. male MRN: [id number]    Unit/Bed#: [facility] 218-02 Encounter: [id number]        Subjective:   Patient seen and examined at bedside after reviewing the chart and discussing the case with the caring staff.      Patient examined at bedside.  Patient has no acute symptoms.     Physical Exam   Vitals: Blood pressure 94/56, pulse 91, temperature 98.7 °F (37.1 °C), temperature source Temporal, resp. rate 17, height 5' 11\" (1.803 m), weight 64.8 kg (142 lb 12.8 oz), SpO2 98%.,Body mass index is 19.92 kg/m².  Constitutional: Patient in no acute distress.  HEENT: PERR, EOMI, MMM.  Cardiovascular: Normal rate and regular rhythm.    Pulmonary/Chest: Effort normal and breath sounds normal.   Abdomen: Soft, + BS, NT.    Assessment/Plan:  Lester Maher is a(n) 18 y.o. male with MDD.     Arthralgia/headache.  Tylenol as needed.  Insomnia.  Trazodone nightly as needed.  Elevated bilirubin.  Total bili 3.21, direct bili 0.42 on 7/29.  AST 12. ALT 6. No prior labs.  No GI symptoms.   Vitamin D deficiency.  Start patient on Vit D2 50,000 units weekly x 10 weeks then Vit D3 1,000 units daily.  Vitamin B12 deficiency.  Start patient on daily Vit B12 supplement.   "

## 2024-08-02 NOTE — PROGRESS NOTES
08/02/24   Team Meeting   Meeting Type Daily Rounds   Team Members Present   Team Members Present Physician;;Nurse;Occupational Therapist   Physician Team Member Dr Divina Gomez; Omega MARTINEZ; Dr Imer ANGLIN   Nursing Team Member Tessa CAST   Social Work Team Member Jose SALAZAR; Javan SHAFER   OT Team Member Pope RAJI   Patient/Family Present   Patient Present No   Patient's Family Present No     201, visible, social, more appropriate, anx/dep improving, dc focused, dc early next week

## 2024-08-03 PROCEDURE — 99232 SBSQ HOSP IP/OBS MODERATE 35: CPT | Performed by: NURSE PRACTITIONER

## 2024-08-03 RX ORDER — LANOLIN ALCOHOL/MO/W.PET/CERES
3 CREAM (GRAM) TOPICAL
Status: DISCONTINUED | OUTPATIENT
Start: 2024-08-03 | End: 2024-08-05 | Stop reason: HOSPADM

## 2024-08-03 RX ORDER — ARIPIPRAZOLE 10 MG/1
10 TABLET ORAL DAILY
Qty: 30 TABLET | Refills: 0 | Status: SHIPPED | OUTPATIENT
Start: 2024-08-04 | End: 2024-09-03

## 2024-08-03 RX ORDER — ERGOCALCIFEROL 1.25 MG/1
50000 CAPSULE ORAL WEEKLY
Qty: 8 CAPSULE | Refills: 0 | Status: SHIPPED | OUTPATIENT
Start: 2024-08-05 | End: 2024-10-01

## 2024-08-03 RX ORDER — LANOLIN ALCOHOL/MO/W.PET/CERES
3 CREAM (GRAM) TOPICAL
Qty: 30 TABLET | Refills: 0 | Status: SHIPPED | OUTPATIENT
Start: 2024-08-03

## 2024-08-03 RX ORDER — ESCITALOPRAM OXALATE 10 MG/1
10 TABLET ORAL DAILY
Qty: 30 TABLET | Refills: 0 | Status: SHIPPED | OUTPATIENT
Start: 2024-08-04 | End: 2024-09-03

## 2024-08-03 RX ADMIN — ARIPIPRAZOLE 10 MG: 10 TABLET ORAL at 09:06

## 2024-08-03 RX ADMIN — ESCITALOPRAM OXALATE 10 MG: 10 TABLET ORAL at 09:06

## 2024-08-03 RX ADMIN — CYANOCOBALAMIN TAB 500 MCG 1000 MCG: 500 TAB at 09:06

## 2024-08-03 RX ADMIN — Medication 3 MG: at 21:07

## 2024-08-03 NOTE — NURSING NOTE
Patient visible in milieu with peers, cooperative, medication compliant and pleasant upon approach. Patient denies all, no anxiety, no depression, no SI/HI or hallucinations. Continued care and continual safety rounds in progress.

## 2024-08-03 NOTE — PROGRESS NOTES
"Progress Note - Lester Maher 18 y.o. male MRN: [id number]    Unit/Bed#: [facility] 218-02 Encounter: [id number]        Subjective:   Patient seen and examined at bedside after reviewing the chart and discussing the case with the caring staff.      Patient examined at bedside.  Patient is complaining of insomnia and is requesting melatonin.    Patient is being discharged on Monday, 8/5/2024.  Patient is requesting his prescriptions.  I reviewed and reconciled patient's problem list and medications.    Physical Exam   Vitals: Blood pressure 90/66, pulse 88, temperature 97.5 °F (36.4 °C), temperature source Temporal, resp. rate 18, height 5' 11\" (1.803 m), weight 64.8 kg (142 lb 12.8 oz), SpO2 97%.,Body mass index is 19.92 kg/m².  Constitutional: Patient in no acute distress.  HEENT: PERR, EOMI, MMM.  Cardiovascular: Normal rate and regular rhythm.    Pulmonary/Chest: Effort normal and breath sounds normal.   Abdomen: Soft, + BS, NT.    Assessment/Plan:  Lester Maher is a(n) 18 y.o. male with MDD.     Medical clearance.  Patient is medically cleared for discharge.  All scripts will be sent out for the patient.    Arthralgia/headache.  Tylenol as needed.  Insomnia.  I will put the patient on melatonin at bedtime along with trazodone nightly as needed.  Elevated bilirubin.  Total bili 3.21, direct bili 0.42 on 7/29.  AST 12. ALT 6. No prior labs.  No GI symptoms.   Vitamin D deficiency.  Start patient on Vit D2 50,000 units weekly x 10 weeks then Vit D3 1,000 units daily.  Vitamin B12 deficiency.  Start patient on daily Vit B12 supplement.   "

## 2024-08-03 NOTE — PROGRESS NOTES
"Progress Note - Behavioral Health   Lester Maher 18 y.o. male MRN: [id number]  Unit/Bed#: [facility] 218-02 Encounter: [id number]    Assessment & Plan   Principal Problem:    MDD (major depressive disorder), recurrent episode, moderate (HCC)  Active Problems:    Generalized anxiety disorder with panic attacks      Behavior over the last 24 hours:  improved  Sleep: PRN trazodone effective  Appetite: normal  Medication side effects: No  ROS: no complaints and all other systems are negative    Devan remains visible, social, and participatory milieu activities.  Presents as calm and cooperative.  Maintaining safety with no return of SI.  Exhibits linear and forward thought process.  No longer reports intrusive/ruminating thoughts.  No bizarre behaviors observed.    Mental Status Evaluation:  Appearance:  Thin  male, bearded, shaggy dark hair, wearing glasses   Behavior:  Cooperative, calm, good eye contact   Speech:  normal pitch and normal volume   Mood:  \"Good\"   Affect:  constricted   Thought Process:  goal directed and linear, forward thinking   Associations: intact associations   Thought Content:  No overt delusions or paranoia, denies ruminations/intrusive thoughts   Perceptual Disturbances: Denied AVH, did not appear internally preoccupied   Risk Potential: Suicidal Ideations none  Homicidal Ideations none  Potential for Aggression No   Sensorium:  person, place, time/date, and situation   Memory:  recent and remote memory grossly intact   Consciousness:  alert and awake    Attention: attention span and concentration were age appropriate   Insight:  Improving   Judgment: Improving   Gait/Station: normal gait/station and normal balance   Motor Activity: no abnormal movements     Progress Toward Goals: Maintaining safety with no return of SI.  Endorses resolution intrusive/ruminating thoughts.  No bizarre behavior.  Forward thinking with adequate safety plan and protective factors against suicide.  Will " continue with current psychotropic regimen; patient tolerating well.  Anticipated discharge 8/5/2024.    Recommended Treatment: Continue with group therapy, milieu therapy and occupational therapy.      Risks, benefits and possible side effects of Medications:   Risks, benefits, and possible side effects of medications explained to patient and patient verbalizes understanding.      Medications: all current active meds have been reviewed, continue current psychiatric medications, and current meds:   Current Facility-Administered Medications   Medication Dose Route Frequency    acetaminophen (TYLENOL) tablet 650 mg  650 mg Oral Q6H PRN    acetaminophen (TYLENOL) tablet 650 mg  650 mg Oral Q4H PRN    acetaminophen (TYLENOL) tablet 975 mg  975 mg Oral Q6H PRN    ARIPiprazole (ABILIFY) tablet 10 mg  10 mg Oral Daily    benztropine (COGENTIN) injection 1 mg  1 mg Intramuscular Q4H PRN Max 6/day    benztropine (COGENTIN) tablet 1 mg  1 mg Oral Q4H PRN Max 6/day    cyanocobalamin (VITAMIN B-12) tablet 1,000 mcg  1,000 mcg Oral Daily    hydrOXYzine HCL (ATARAX) tablet 50 mg  50 mg Oral Q6H PRN Max 4/day    Or    diphenhydrAMINE (BENADRYL) injection 50 mg  50 mg Intramuscular Q6H PRN    ergocalciferol (VITAMIN D2) capsule 50,000 Units  50,000 Units Oral Weekly    escitalopram (LEXAPRO) tablet 10 mg  10 mg Oral Daily    hydrOXYzine HCL (ATARAX) tablet 100 mg  100 mg Oral Q6H PRN Max 4/day    Or    LORazepam (ATIVAN) injection 2 mg  2 mg Intramuscular Q6H PRN    hydrOXYzine HCL (ATARAX) tablet 25 mg  25 mg Oral Q6H PRN Max 4/day    OLANZapine (ZyPREXA) tablet 5 mg  5 mg Oral Q4H PRN Max 3/day    Or    OLANZapine (ZyPREXA) IM injection 2.5 mg  2.5 mg Intramuscular Q4H PRN Max 3/day    OLANZapine (ZyPREXA) tablet 5 mg  5 mg Oral Q3H PRN Max 3/day    Or    OLANZapine (ZyPREXA) IM injection 5 mg  5 mg Intramuscular Q3H PRN Max 3/day    OLANZapine (ZyPREXA) tablet 2.5 mg  2.5 mg Oral Q4H PRN Max 6/day    propranolol (INDERAL) tablet  10 mg  10 mg Oral Q8H PRN    senna-docusate sodium (SENOKOT S) 8.6-50 mg per tablet 1 tablet  1 tablet Oral Daily PRN    traZODone (DESYREL) tablet 100 mg  100 mg Oral HS PRN   .    Labs: I have personally reviewed all pertinent laboratory/tests results.     Counseling / Coordination of Care  Total floor / unit time spent today 20 minutes. Greater than 50% of total time was spent with the patient and / or family counseling and / or coordination of care. A description of the counseling / coordination of care: Medication education, treatment plan, safety/discharge planning

## 2024-08-03 NOTE — NURSING NOTE
Patient observed resting comfortably in their bed without signs/symptoms of distress. Continued care and continual safety checks in progress.

## 2024-08-04 PROCEDURE — 99232 SBSQ HOSP IP/OBS MODERATE 35: CPT | Performed by: NURSE PRACTITIONER

## 2024-08-04 RX ADMIN — ESCITALOPRAM OXALATE 10 MG: 10 TABLET ORAL at 08:13

## 2024-08-04 RX ADMIN — Medication 3 MG: at 22:11

## 2024-08-04 RX ADMIN — CYANOCOBALAMIN TAB 500 MCG 1000 MCG: 500 TAB at 08:13

## 2024-08-04 RX ADMIN — ARIPIPRAZOLE 10 MG: 10 TABLET ORAL at 08:13

## 2024-08-04 NOTE — PROGRESS NOTES
"Progress Note - Lester Maher 18 y.o. male MRN: [id number]    Unit/Bed#: [facility] 218-02 Encounter: [id number]        Subjective:   Patient seen and examined at bedside after reviewing the chart and discussing the case with the caring staff.      Patient examined at bedside.  Patient reports no acute symptoms.    Patient is being discharged on Monday, 8/5/2024.  Patient is requesting his prescriptions.  I reviewed and reconciled patient's problem list and medications.    Physical Exam   Vitals: Blood pressure 104/81, pulse 67, temperature 98.7 °F (37.1 °C), temperature source Temporal, resp. rate 17, height 5' 11\" (1.803 m), weight 65.8 kg (145 lb), SpO2 99%.,Body mass index is 20.22 kg/m².  Constitutional: Patient in no acute distress.  HEENT: PERR, EOMI, MMM.  Cardiovascular: Normal rate and regular rhythm.    Pulmonary/Chest: Effort normal and breath sounds normal.   Abdomen: Soft, + BS, NT.    Assessment/Plan:  Lester Maher is a(n) 18 y.o. male with MDD.     Medical clearance.  Patient is medically cleared for discharge.  All scripts will be sent out for the patient.    Arthralgia/headache.  Tylenol as needed.  Insomnia.  I will put the patient on melatonin at bedtime along with trazodone nightly as needed.  Elevated bilirubin.  Total bili 3.21, direct bili 0.42 on 7/29.  AST 12. ALT 6. No prior labs.  No GI symptoms.   Vitamin D deficiency.  Start patient on Vit D2 50,000 units weekly x 10 weeks then Vit D3 1,000 units daily.  Vitamin B12 deficiency.  Start patient on daily Vit B12 supplement.   "

## 2024-08-04 NOTE — NURSING NOTE
Visible on the unit and social with peers. Compliant with medication. Pleasant and cooperative with staff during care. Endorsed mild to moderate anxiety. Denied SI, HI, AVH, or depression. Pt's affect was flat. Speech pattern was normal and relaxed. Eye contact was good. Appearence and hygiene was unremarkable. Made no c/o pain or discomfort. 7 minute safety checks continued.

## 2024-08-04 NOTE — PLAN OF CARE
Problem: Depression  Goal: Treatment Goal: Demonstrate behavioral control of depressive symptoms, verbalize feelings of improved mood/affect, and adopt new coping skills prior to discharge  Outcome: Progressing     Problem: Anxiety  Goal: Anxiety is at manageable level  Description: Interventions:  - Assess and monitor patient's anxiety level.   - Monitor for signs and symptoms (heart palpitations, chest pain, shortness of breath, headaches, nausea, feeling jumpy, restlessness, irritable, apprehensive).   - Collaborate with interdisciplinary team and initiate plan and interventions as ordered.  - [place] patient to unit/surroundings  - Explain treatment plan  - Encourage participation in care  - Encourage verbalization of concerns/fears  - Identify coping mechanisms  - Assist in developing anxiety-reducing skills  - Administer/offer alternative therapies  - Limit or eliminate stimulants  Outcome: Progressing   See chart note

## 2024-08-04 NOTE — PROGRESS NOTES
"Progress Note - Behavioral Health   Lester Maher 18 y.o. male MRN: [id number]  Unit/Bed#: [facility] 218-02 Encounter: [id number]    Assessment & Plan   Principal Problem:    MDD (major depressive disorder), recurrent episode, moderate (HCC)  Active Problems:    Generalized anxiety disorder with panic attacks      Behavior over the last 24 hours:  improved  Sleep: normal  Appetite: normal  Medication side effects: No  ROS: no complaints and all other systems are negative    Devan remains visible, social, and engaged in milieu activities.  Maintaining mood control and safety.  Denies return of SI.  Able to identify adequate safety plan and protective factors against suicide.  Reports resolution of intrusive/ruminating thoughts.  No bizarre behaviors.  Hygiene is also improved.    Mental Status Evaluation:  Appearance:  Thin  male, bearded, shaggy brown hair, wearing glasses   Behavior:  Cooperative, calm   Speech:  normal pitch and normal volume   Mood:  \"A little bored\"   Affect:  mood-congruent and redirectable   Thought Process:  goal directed and linear, forward thinking   Associations: intact associations   Thought Content:  No overt delusions or paranoia, denies intrusive/ruminating thoughts   Perceptual Disturbances: Denied AVH, did not appear internally preoccupied   Risk Potential: Suicidal Ideations none  Homicidal Ideations none  Potential for Aggression No   Sensorium:  person, place, time/date, and situation   Memory:  recent and remote memory grossly intact   Consciousness:  alert and awake    Attention: attention span and concentration were age appropriate   Insight:  Improving   Judgment: Improving   Gait/Station: normal gait/station and normal balance   Motor Activity: no abnormal movements     Progress Toward Goals: Maintaining safety and mood control with no return of SI.  Thoughts no longer ruminating/intrusive, but forward thinking and goal directed.  Hygiene improved.  Plan is to " continue with current psychotropic regimen; patient tolerating well.  Will discharge home 8/5/2024 with outpatient psychiatric follow-up scheduled at [facility]  8/8/2024.    Recommended Treatment: Continue with group therapy, milieu therapy and occupational therapy.      Risks, benefits and possible side effects of Medications:   Risks, benefits, and possible side effects of medications explained to patient and patient verbalizes understanding.      Medications: all current active meds have been reviewed, continue current psychiatric medications, and current meds:   Current Facility-Administered Medications   Medication Dose Route Frequency    acetaminophen (TYLENOL) tablet 650 mg  650 mg Oral Q6H PRN    acetaminophen (TYLENOL) tablet 650 mg  650 mg Oral Q4H PRN    acetaminophen (TYLENOL) tablet 975 mg  975 mg Oral Q6H PRN    ARIPiprazole (ABILIFY) tablet 10 mg  10 mg Oral Daily    benztropine (COGENTIN) injection 1 mg  1 mg Intramuscular Q4H PRN Max 6/day    benztropine (COGENTIN) tablet 1 mg  1 mg Oral Q4H PRN Max 6/day    cyanocobalamin (VITAMIN B-12) tablet 1,000 mcg  1,000 mcg Oral Daily    hydrOXYzine HCL (ATARAX) tablet 50 mg  50 mg Oral Q6H PRN Max 4/day    Or    diphenhydrAMINE (BENADRYL) injection 50 mg  50 mg Intramuscular Q6H PRN    ergocalciferol (VITAMIN D2) capsule 50,000 Units  50,000 Units Oral Weekly    escitalopram (LEXAPRO) tablet 10 mg  10 mg Oral Daily    hydrOXYzine HCL (ATARAX) tablet 100 mg  100 mg Oral Q6H PRN Max 4/day    Or    LORazepam (ATIVAN) injection 2 mg  2 mg Intramuscular Q6H PRN    hydrOXYzine HCL (ATARAX) tablet 25 mg  25 mg Oral Q6H PRN Max 4/day    melatonin tablet 3 mg  3 mg Oral HS    OLANZapine (ZyPREXA) tablet 5 mg  5 mg Oral Q4H PRN Max 3/day    Or    OLANZapine (ZyPREXA) IM injection 2.5 mg  2.5 mg Intramuscular Q4H PRN Max 3/day    OLANZapine (ZyPREXA) tablet 5 mg  5 mg Oral Q3H PRN Max 3/day    Or    OLANZapine (ZyPREXA) IM injection 5 mg  5 mg Intramuscular Q3H PRN  Max 3/day    OLANZapine (ZyPREXA) tablet 2.5 mg  2.5 mg Oral Q4H PRN Max 6/day    propranolol (INDERAL) tablet 10 mg  10 mg Oral Q8H PRN    senna-docusate sodium (SENOKOT S) 8.6-50 mg per tablet 1 tablet  1 tablet Oral Daily PRN    traZODone (DESYREL) tablet 100 mg  100 mg Oral HS PRN   .    Labs: I have personally reviewed all pertinent laboratory/tests results.     Counseling / Coordination of Care  Total floor / unit time spent today 20 minutes. Greater than 50% of total time was spent with the patient and / or family counseling and / or coordination of care. A description of the counseling / coordination of care: Medication education, treatment plan, safety/discharge planning

## 2024-08-05 VITALS
WEIGHT: 145 LBS | RESPIRATION RATE: 18 BRPM | HEIGHT: 71 IN | OXYGEN SATURATION: 98 % | TEMPERATURE: 97.5 F | BODY MASS INDEX: 20.3 KG/M2 | DIASTOLIC BLOOD PRESSURE: 61 MMHG | SYSTOLIC BLOOD PRESSURE: 105 MMHG | HEART RATE: 84 BPM

## 2024-08-05 PROBLEM — F41.1 GENERALIZED ANXIETY DISORDER WITH PANIC ATTACKS: Status: RESOLVED | Noted: 2024-07-28 | Resolved: 2024-08-05

## 2024-08-05 PROBLEM — F41.0 GENERALIZED ANXIETY DISORDER WITH PANIC ATTACKS: Status: RESOLVED | Noted: 2024-07-28 | Resolved: 2024-08-05

## 2024-08-05 PROBLEM — F33.1 MDD (MAJOR DEPRESSIVE DISORDER), RECURRENT EPISODE, MODERATE (HCC): Status: RESOLVED | Noted: 2024-07-28 | Resolved: 2024-08-05

## 2024-08-05 PROCEDURE — 99238 HOSP IP/OBS DSCHRG MGMT 30/<: CPT | Performed by: NURSE PRACTITIONER

## 2024-08-05 RX ADMIN — ESCITALOPRAM OXALATE 10 MG: 10 TABLET ORAL at 08:22

## 2024-08-05 RX ADMIN — ARIPIPRAZOLE 10 MG: 10 TABLET ORAL at 08:22

## 2024-08-05 RX ADMIN — ERGOCALCIFEROL 50000 UNITS: 1.25 CAPSULE, LIQUID FILLED ORAL at 08:22

## 2024-08-05 RX ADMIN — CYANOCOBALAMIN TAB 500 MCG 1000 MCG: 500 TAB at 08:22

## 2024-08-05 NOTE — PROGRESS NOTES
"Progress Note - Lester Maher 18 y.o. male MRN: [id number]    Unit/Bed#: [facility] 218-02 Encounter: [id number]        Subjective:   Patient seen and examined at bedside after reviewing the chart and discussing the case with the caring staff.      Patient examined at bedside.  Patient reports no acute symptoms.    Patient is being discharged on Monday, 8/5/2024.  Patient is requesting his prescriptions.  I reviewed and reconciled patient's problem list and medications.    Physical Exam   Vitals: Blood pressure 105/61, pulse 84, temperature 97.5 °F (36.4 °C), temperature source Temporal, resp. rate 18, height 5' 11\" (1.803 m), weight 65.8 kg (145 lb), SpO2 98%.,Body mass index is 20.22 kg/m².  Constitutional: Patient in no acute distress.  HEENT: PERR, EOMI, MMM.  Cardiovascular: Normal rate and regular rhythm.    Pulmonary/Chest: Effort normal and breath sounds normal.   Abdomen: Soft, + BS, NT.    Assessment/Plan:  Lester Maher is a(n) 18 y.o. male with MDD.     Medical clearance.  Patient is medically cleared for discharge.  All scripts will be sent out for the patient.    Arthralgia/headache.  Tylenol as needed.  Insomnia.  I will put the patient on melatonin at bedtime along with trazodone nightly as needed.  Elevated bilirubin.  Total bili 3.21, direct bili 0.42 on 7/29.  AST 12. ALT 6. No prior labs.  No GI symptoms.   Vitamin D deficiency.  Start patient on Vit D2 50,000 units weekly x 10 weeks then Vit D3 1,000 units daily.  Vitamin B12 deficiency.  Start patient on daily Vit B12 supplement.     The patient was discussed with Dr. Bingham and he is in agreement with the above note.    "

## 2024-08-05 NOTE — DISCHARGE SUMMARY
Discharge Summary - Behavioral Health   Lester Maher 18 y.o. male MRN: [id number]  Unit/Bed#: -02 Encounter: [id number]     Admission Date: 7/27/2024         Discharge Date: 8/5/2024 10:15 AM    Attending Psychiatrist: Dr. Trixie Murcia    Reason for Admission/HPI: Major depressive disorder, single episode, unspecified [F32.9]  Depression, unspecified [F32.A]    According to H&P by Dr. Ochoa 7/28/24:    History of Present Illness  This is a comprehensive psychiatric assessment for the patient Lester Maher, who is currently being admitted to [facility] inpatient behavioral health unit on a voluntary 201 commitment basis due to worsening symptoms of depression and anxiety, with panic attacks that have been increasing in frequency and suicidal thoughts with plan to stab himself with a knife or jump off of a parking garage with the intent to die. Lester is a 18-year-old male, single (ended a relationship approximately 2 weeks ago), no children, currently living in [place] with his father, currently employed as a  for the [employer]. Lester has no significant past medical history. Lester has a past psychiatric history of unspecified depression and anxiety.     The following italicized information is copied from the crisis evaluation note 7/27/2024     Crisis Intake  Patient Intake  Special Needs: N/A  Living Arrangement: Lives with someone (lives with parents)  Can patient return home?: Yes (following hospital evaluation and potential inpatient stay)  Address to be Discharge to:: see chart  Patient's Telephone Number: [phone]  Access to Firearms: No  Type of Work: [employer]  Work History: Part-time  School Name: [place] [employer] (one year)  School Grade/Year: College freshman  Unemployed / MA applicant:: N/A  Patient History  Presenting Problems: Patient presents to the walk-in center with  "suicidal ideations and visual hallucinations. Patient reports that he recently cut himself in the chest area in response to ongoing anxiety. Patient reports that he \"doesn't see people or buildings, only open husks.\" Patient also reports seeing \"ghosts.\" Patient reports experiencing \"bizarre thoughts like wondering what another person's blood might taste like.\" Patient stated that he \"fears he may murder someone\" but then denies having thoughts to harm others. Patient states that he has a history of self-harm by cutting and states \"it was fun.\" Patient reports that his anxiety is \"raised to the max\" and he is experiencing \"random irritability.\" Patient lives at home with his parents and works part time at [employer]. Patient denies changes in eating or sleeping habits. Patient denies history of mental health treatment or substance abuse as well as any legal involvement. Patient reports that he talked with school counselors in high school and would have \"casual conversations with psychology professors\" in college but \"nothing official.\" This writer reviewed the 201 process and patient rights to which patient acknowledged understanding. Patient signed a 201 and is being transported to [facility]. Crisis worker and charge nurse notified.  Treatment History: School counseling in high school  Currently in Treatment: No  Name of ICM:: N/A  ICM Phone Number:: N/A  Community Agency Supports: N/A  Medical Problems: seasonal allergies  Legal Issues: N/A  Probation/ Name (if applicable): denies  Substance Abuse: No  Mental Status Exam  Orientation Level: Oriented X4  Affect: Appropriate  Speech: Soft  Mood: Irritable, Anxious  Thought Content: Suicidal ideation, Homicidal ideation  Hallucination Type: Auditory, Visual  Describe Hallucinations: Patient reports an inability to see people or buildings, stating he only sees \"husks.\" Patient also describes seeing ghosts.  Judgement: Poor  Impulse " Control: Poor  Attention Span: Appropriate for age  Memory: Impaired  Appetite: Good  Weight Changes: denies  Sleep: Good  Total Hours of Sleep: 8 hours nightly  Specific Sleep Problem: denies  Appear/Hygiene: Bizarre  ADL Comments: Independent  Strengths and Limitations  Patient Strengths: Good support system, Financially secure, Insightful, Self reliant, Negotiates basic needs, Family ties  Patient Limitations: Difficulty adapting, Poor reasoning ability  Ideations  Current Self Harm/Suicidal Ideation: Yes  Description of current self harm/suicidal ideation:: patient reports self harm via cutting chest in recent weeks  Previous Self Harm/Suicidal Ideation: Yes  Description of self harming behaviors or thoughts:: patient reports self harm(cutting) on chest in recent weeks  Violence Risk to Self: Yes- Within the past 6 months  Current homicidal or violent thoughts toward another: Yes- Within the past 6 months (Patient states that he worries he will murder someone but then denies thoughts to harm others.)  Description of current thoughts/plans:: N/A  Previous Plans to Harm Another Person: No  Description of violent thoughts or behaviors toward others:: Patient states that he worries he will murder someone but then denies thoughts to harm others.  Violence Risk to Others: Yes- Within the last 6 months (Patient states that he worries he will murder someone but then denies thoughts to harm others.)  Previous History of Violence to Others: No  Provisional Diagnosis  Axis I: Major Depressive Disorder     Lester was seen today for comprehensive psychiatric assessment.  At the time of interview Lester is calm, polite, and cooperative with the interview.  He appears constricted in his affect. During today's examination, Lester does not exhibit objective evidence of beatris/hypomania or psychosis. Lester is not currently irritable, grandiose, labile, or pathologically euphoric. Lester denies perceptual  "disturbances, such as A/V hallucinations, and does not endorse paranoia, ideas of reference, or delusional beliefs. Lester denies recent alcohol or recreational substance abuse.     Today, Lester confirms the aforementioned information.  He reports that he has been struggling with prominent symptoms of depression that have been progressively worsening over the past 6 months in the setting of life stressors.  Current stressors include family stressors, relationship stressors, job stressors.  Lester reports that his parents  in 2016 and following this he moved with his father to [place] to live in [place]. Lester reports that his father is a hardworking and caring individual, however is \"overbearing\" and Lester reports \"I live in the shadow of my father.\" Lester reports that he finds it difficult to connect with his father, stating that his father \"always wants to be involved\" and he reports that this causes tension in their relationship. Lester feels more comfortable talking to his mother, however he feels a sense of distance from his mother as she lives in [place]. He finds himself in the middle of his parents disagreements.  Lester reports that he recently ended a relationship about 2 weeks ago (this relationship lasted almost 2 years) with his former girlfriend due to both of them struggling with anxiety. He states \"we were both having panic attacks and it wasn't working out.\"   Lester reports that he finds his job stressful.  He works as a  for [employer] and he reports dealing with the personalities of the patrons to be frustrating.     In the context of the aforementioned stressors, Lester reports that over the past 6 months his symptoms of depression and anxiety have sharply worsened.  He reports that he has been struggling with sleep (this is also exacerbated by working the night shift), decreased life interest (he does enjoy " "reading in his free time), prominent feelings of hopelessness (stating that he feels \"hollow\"), reports decreased energy.  He reports no significant changes to his appetite or concentration.  He reports over the past 6 months he has been experiencing suicidal ideation with plan to stab himself with a knife or jump off of a parking garage with the intent to die. He endorses vague homicidal ideations (stating he worries he will harm someone, however denies any plan or intent to act on these thoughts).     Lester denies any acute or chronic history suggestive of an underlying affective (bipolar) organization. Lester denies previous episodes of elevated/expansive mood, lengthy periods without sleep, grandiosity, or intense and prolonged irritability. Lester denies atypical periods of increased goal-directed behavior, excessive spending, or sexual promiscuity. The patient has no history of pathologic impulsivity or extreme mood lability.     At the time of interview, Lester does report that, in the context of depression, he experiences internal monologue \"that I shouldn't be waking up in the morning.\"  At the time of interview he does not endorse visual or auditory hallucinations.     Following a thorough discussion, the patient was started on Lexapro 10 mg daily for symptoms of depression and Abilify 2 mg daily for augmentation of antidepressant.     Hospital Course: The patient was admitted to the inpatient psychiatric unit and started on every 7 minutes precautions. During the hospitalization the patient was attending individual therapy, group therapy, milieu therapy and occupational therapy.    Psychiatric medications were titrated over the hospital stay to address depression, depressive symptoms, racing thoughts, visual hallucinations, anxiety symptoms, and suicidal ideation with plan to stab self or jump off parking garage.  Lester was started on antidepressant Lexapro, antipsychotic " medication Abilify, and hypnotic medication Melatonin. Medication doses were titrated during the hospital course. Prior to beginning of treatment medications risks and benefits and possible side effects including risk of parkinsonian symptoms, Tardive Dyskinesia and metabolic syndrome related to treatment with antipsychotic medications, risk of cardiovascular events in elderly related to treatment with antipsychotic medications, risk of suicidality and serotonin syndrome related to treatment with antidepressants, and risk of impaired next-day mental alertness, complex sleep-related behavior and dependence related to treatment with hypnotic medications were reviewed with the patient. Bobbyazael verbalized understanding and agreement for treatment.  Medication education remained ongoing throughout hospitalization.    Early in admission, Devan presented as disheveled, malodorous, and bizarre in affect.  Continued to endorse ongoing depression but denied SI.  Anxiety persisted in absence of panic attacks.  Patient was agreeable to further titration of Abilify to address intrusive/ruminating thoughts, mood stability, and depression.  Throughout his inpatient stay, Devan was able to maintain safety and mood control with no agitation or irritability.  He was visible, social, and engaged in milieu activities.  With titration of Abilify, Devan endorsed improvement with mood control, depression, and anxiety; presented with congruent affect.  Sleep and self-care were also improving.    Abilify was successfully titrated to 10 mg daily to which patient tolerated well with no adverse effects.  At this point, he reported resolution with intrusive/ruminating thoughts and continued to maintain safety with no return of SI.  Thoughts were organized and forward thinking.  Anxiety and depression continued to improve with no return of panic attacks.  Devan continued to maintain self-care and improved sleep.  He verbalized his readiness  for discharge and was able to identify adequate safety plan and protective factors against suicide.    Safety plan includes talking to this friend Zaire, parents, contacting outpatient psychiatrist/therapist, utilizing crisis hotline, or returning to the nearest hospital.  Devan also identified his family and friends as strong protective factors against suicide.  Forward thinking with plan to continue medication compliance, return to work, and plan to obtain a promotion at work.    Since Devan was maintaining safety and improvement, decision was made to begin discharge preparations.  On day of discharge, Devan continued to maintain safety and mood control with no return of SI.  He exhibited no signs or symptoms of acute beatris, hypomania, or psychosis.  Thoughts were organized, linear, and forward thinking with no delusional content verbalized.  Continued to identify resolution of intrusive/ruminating thoughts.  Adamantly denied suicidal or homicidal ideation, intent, or plan.    Since Lester was doing well at the end of the hospitalization, treatment team felt that he could be safely discharged to outpatient care. Prior to discharge  spoke with Lester's father (Lito) to address support and Lester's readiness for discharge. Lito felt comfortable with Lester release from the hospital and was going to provide support to him after discharge. Lester also felt stable and ready for discharge at the end of the hospital stay.    The outpatient follow up with [facility] for psychiatric intake 8/8/24 @ 10 AM was arranged by the unit  upon discharge.    Lester was stabilized and discharged on the following psychotropic regimen: Abilify 10 mg PO QD, Lexapro 10mg PO QD, and melatonin 3mg PO QHS. He was tolerating medications well and denied any side effects at time of discharge.    Mental Status at time of Discharge:     Appearance:  Thin  male, casually  "dressed, bearded, wearing glasses   Behavior:  Cooperative, calm, good eye contact   Speech:  normal pitch and normal volume   Mood:  \"Good\"   Affect:  mood-congruent   Thought Process:  goal directed and linear, forward thinking   Thought Content:  No overt delusions or paranoia verbalized, denied intrusive/ruminating thoughts   Perceptual Disturbances: Denied AVH, did not appear internally preoccupied   Risk Potential: Suicidal Ideations none, Homicidal Ideations none, and Potential for Aggression No   Sensorium:  person, place, time/date, and situation   Cognition:  recent and remote memory grossly intact   Consciousness:  alert and awake    Attention: attention span and concentration were age appropriate   Insight:  Improved, fair   Judgment: Improved, fair   Gait/Station: normal gait/station and normal balance   Motor Activity: no abnormal movements     Admission Diagnosis:Major depressive disorder, single episode, unspecified [F32.9]  Depression, unspecified [F32.A]    Discharge Diagnosis:   Principal Problem (Resolved):    MDD (major depressive disorder), recurrent episode, moderate (HCC)  Active Problems:    * No active hospital problems. *  Resolved Problems:    Generalized anxiety disorder with panic attacks    Lab results:  Admission on 07/27/2024, Discharged on 08/05/2024   Component Date Value    Sodium 07/28/2024 140     Potassium 07/28/2024 3.8     Chloride 07/28/2024 106     CO2 07/28/2024 26     ANION GAP 07/28/2024 8     BUN 07/28/2024 9     Creatinine 07/28/2024 0.85     Glucose 07/28/2024 77     Glucose, Fasting 07/28/2024 77     Calcium 07/28/2024 9.3     AST 07/28/2024 12 (L)     ALT 07/28/2024 6 (L)     Alkaline Phosphatase 07/28/2024 54     Total Protein 07/28/2024 6.4     Albumin 07/28/2024 4.6     Total Bilirubin 07/28/2024 3.23 (H)     eGFR 07/28/2024 125     WBC 07/28/2024 6.34     RBC 07/28/2024 4.42     Hemoglobin 07/28/2024 13.4     Hematocrit 07/28/2024 39.8     MCV 07/28/2024 90     " MCH 07/28/2024 30.3     MCHC 07/28/2024 33.7     RDW 07/28/2024 12.1     MPV 07/28/2024 9.8     Platelets 07/28/2024 222     nRBC 07/28/2024 0     Segmented % 07/28/2024 58     Immature Grans % 07/28/2024 0     Lymphocytes % 07/28/2024 32     Monocytes % 07/28/2024 8     Eosinophils Relative 07/28/2024 2     Basophils Relative 07/28/2024 0     Absolute Neutrophils 07/28/2024 3.67     Absolute Immature Grans 07/28/2024 0.01     Absolute Lymphocytes 07/28/2024 2.05     Absolute Monocytes 07/28/2024 0.48     Eosinophils Absolute 07/28/2024 0.11     Basophils Absolute 07/28/2024 0.02     TSH 3RD GENERATON 07/28/2024 0.620     Vit D, 25-Hydroxy 07/28/2024 12.4 (L)     Cholesterol 07/28/2024 124     Triglycerides 07/28/2024 49     HDL, Direct 07/28/2024 44     LDL Calculated 07/28/2024 70     Folate 07/28/2024 9.2     Vitamin B-12 07/28/2024 215     Total Bilirubin 07/29/2024 3.21 (H)     Bilirubin, Direct 07/29/2024 0.42 (H)     Alkaline Phosphatase 07/29/2024 55     AST 07/29/2024 12 (L)     ALT 07/29/2024 6 (L)     Total Protein 07/29/2024 7.3     Albumin 07/29/2024 4.8        Discharge Medications:  Discharge Medication List as of 8/5/2024  9:29 AM        START taking these medications    Details   ARIPiprazole (ABILIFY) 10 mg tablet Take 1 tablet (10 mg total) by mouth daily, Starting Sun 8/4/2024, Until Tue 9/3/2024, Normal      cyanocobalamin (VITAMIN B-12) 1000 MCG tablet Take 1 tablet (1,000 mcg total) by mouth daily, Starting Sun 8/4/2024, Normal      ergocalciferol (VITAMIN D2) 50,000 units Take 1 capsule (50,000 Units total) by mouth once a week for 9 doses, Starting Mon 8/5/2024, Until Tue 10/1/2024, Normal      escitalopram (LEXAPRO) 10 mg tablet Take 1 tablet (10 mg total) by mouth daily, Starting Sun 8/4/2024, Until Tue 9/3/2024, Normal      melatonin 3 mg Take 1 tablet (3 mg total) by mouth daily at bedtime, Starting Sat 8/3/2024, Normal              Discharge Medication List as of 8/5/2024  9:29 AM            Discharge Medication List as of 8/5/2024  9:29 AM           Discharge Medication List as of 8/5/2024  9:29 AM           Discharge instructions/Information to patient and family:   See after visit summary for information provided to patient and family.      Provisions for Follow-Up Care:  See after visit summary for information related to follow-up care and any pertinent home health orders.      Discharge Statement:    I spent 25 minutes discharging the patient. This time was spent on the day of discharge. I had direct contact with the patient on the day of discharge.     I reviewed with Lester importance of compliance with medications and outpatient treatment after discharge.  I discussed the medication regimen and possible side effects of the medications with Lester prior to discharge. At the time of discharge he was tolerating psychiatric medications.  I discussed outpatient follow up with Lester.  I reviewed with Lester crisis plan and safety plan upon discharge.  Lester agreed to abstain from drug and alcohol use after discharge.  No records found for controlled prescriptions according to [place] Prescription Drug Monitoring Program.    MICHELLE Jones 08/05/24

## 2024-08-05 NOTE — BH TRANSITION RECORD
Contact Information: If you have any questions, concerns, pended studies, tests and/or procedures, or emergencies regarding your inpatient behavioral health visit. Please contact [facility] # [phone] and ask to speak to a , nurse or physician. A contact is available 24 hours/ 7 days a week at this number.     Summary of Procedures Performed During your Stay:  Below is a list of major procedures performed during your hospital stay and a summary of results:  - No major procedures performed.    Pending Studies (From admission, onward)      None          Please follow up on the above pending studies with your PCP and/or referring provider.

## 2024-08-05 NOTE — PROGRESS NOTES
08/05/24    Team Meeting   Meeting Type Daily Rounds   Team Members Present   Team Members Present Physician;;Nurse;Occupational Therapist   Physician Team Member Dr Diana ANGLIN; Omega MARTINEZ; Dr Imer ANGLIN   Nursing Team Member Pau RN; Lizzy CAST   Social Work Team Member Jose SALAZAR; Javan SHAFER   OT Team Member Pope RAJI   Patient/Family Present   Patient Present No   Patient's Family Present No   Dc home with OP [facility] med mgmt appt; declined need for pcp, D&A. Resources provided.

## 2024-08-05 NOTE — NURSING NOTE
Patient observed within the milieu, intermittently socializing amongst peers. He continues to be flat in affect, although he reports that his mood and symptoms have improved and he states he feels he is ready to go tomorrow. Continues to be socially awkward but appropriate in behavior.  He denies SI/HI and hallucinations. Denies any questions or concerns. Q7 minute monitoring ongoing.

## 2024-08-05 NOTE — NURSING NOTE
Patient escorted to the main lobby with all belongings. All discharge paperwork reviewed with patient. Patient denied SI/HI. Patient appropriate in mood and affect.

## 2024-08-05 NOTE — SOCIAL WORK
Sw met with pt for dc planning. Pt denies current SI/HI/AVH/dep/anx, agreeable to OP Psych appt with [facility]; declined need for pcp, D&A. States father will pick him up today 10am.

## 2024-08-23 ENCOUNTER — TELEPHONE (OUTPATIENT)
Dept: BEHAVIORAL/MENTAL HEALTH CLINIC | Facility: CLINIC | Age: 19
End: 2024-08-23

## 2024-08-23 NOTE — TELEPHONE ENCOUNTER
Return telephone call from patient. Patient states that he is doing good.     Patient was hospitalized at [facility] from 7/27/24-8/5/24. Patient was discharged with outpatient services through [facility].Patient confirmed that he completed his intake appointment.     Discussion held with patient to call [facility], return to walk-in center, call 911 or go to the nearest emergency room immediately if their situation changes/worsens.